# Patient Record
Sex: FEMALE | Race: WHITE | NOT HISPANIC OR LATINO | Employment: FULL TIME
[De-identification: names, ages, dates, MRNs, and addresses within clinical notes are randomized per-mention and may not be internally consistent; named-entity substitution may affect disease eponyms.]

---

## 2017-11-26 ENCOUNTER — HEALTH MAINTENANCE LETTER (OUTPATIENT)
Age: 27
End: 2017-11-26

## 2018-03-08 ENCOUNTER — TELEPHONE (OUTPATIENT)
Dept: TRANSPLANT | Facility: CLINIC | Age: 28
End: 2018-03-08

## 2018-03-08 NOTE — TELEPHONE ENCOUNTER
"Skip to main content     Susi Anguiano  Tab Navigation  HomeKidney Donor Surveys(Currently Selected)Liver Donor SurveysKidney Quarterly ReportLiver Quarterly Report    Content Starts Here  Kidney Donor Survey  Kidney Donor Survey  [id number]     Printable View     Back to List: Kidney Donor Surveys  Open Activities[0]  Activity History[1]  Kidney Donor Survey Detail     LIVING KIDNEY DONOR EVALUATION  Donor First Name   Octavia  Donor MRN      Donor Last Name   Kamila  Completed   3/5/2018 4:44 PM     1990  Record ID   [id number]  BREEZE Screen   PASSED       Intended Recipient  Recipient First Name   Pooja  Recipient MRN      Recipient Last Name   Kim  Relationship   Not related (Other)  Recipient    1964  Recipient Diagnosis      Recipient's ABO           Donor Information  Age   27  Gender   Female  Height   5' 4''  Race     Weight   160  Ethnicity   Not /  BMI   27.5  Preferred Language   English       Required   No      Blood Type   Unknown  Demographics  Home Address   [address] [place] [address] dr Smith #   [phone]  [facility] #      [facility] Code   [address]  Type      Country   [place]  Preferred Contact day   Mon, Fri, Thur, Wed, Tue  Email   [email or web address]  Preferred Contact time   11:00 AM-1:00 PM, 09:00 AM-11:00 AM  Donor's Medical Information  Medical History   Hypothyroidism  Medications   Levothyroxine  Surgical History   None Reported  Allergies   NKDA  Social History   EtOH: Rare (1-2 drinks/month)   Illicit Drug Use: Denies   Tobacco: Denies  Self-Reported Functional Status   \"I am able to participate in moderate recreational activities like golf, double tennis, dancing, throwing a baseball or football\"  Family Medical History   Cancer (denies)   Diabetes (denies)   Heart Disease (denies)   Hypertension (denies)   Kidney Disease (denies)   Kidney Stones (denies)  Exercise " Frequency   Exercise (1 X per week)  Review of Organ Systems  Review of Systems   Airway or Lungs: No   Blood Disorder: No   Cancer: No   Diabetes,Thyroid,Adrenal,Endocrine Disorder: Yes   Digestive or Liver: No   Female Health: No   Heart or Circulatory System: No   Immune Diseases: No   Kidneys and Bladder: No   Muscles,Bones,Joints: No   Neuro: No   Psych: No  Donor's Social Information  Marital Status     Living Accommodation   Owns own home/apartment  Level of Education   Some college education  Living Arrangement   With spouse  Employment Status   Full Time  Concerns: health and life insurance   No  Employer   [employer]  Concerns: job security and lost income   No  Occupation           Medical Insurance Status   Has medical insurance       High Risk Behavior  High Risk Behaviors   Blood transfusion < 12 months. (NO)   Commercial sex < 12 months. (NO)   Illicit IV drug use < 5yrs. (NO)   Other high risk sexual contact < 12 months. (NO)  Reason for Donation  Referral   Tx Candidate  Reason for Donation   I am hoping I can donate my kidney to my husbands Aunt Yael who is currently on dialysis  Permission to Disclose Inquiry   Yes  Patient Comments      Donor Motivation Level   Highly motivated donor       PCP Contact  [facility]  [facility] Phone   [phone]  Emergency Contact  First Name   Devon  First Name   Imelda  Last Name   Kamila  Last Name   Blue  Phone #   [phone]  Phone #   [phone]  Phone Type   Mobile  Phone Type   Mobile  Relationship   Spouse  Relationship   Mother  Office Use  Reviewed By   Dimple Ferrara   3/7/2018 8:45 AM  Admin Folder   Accept  Comments   no MRN  Lost for Followup   Not Checked  Extended Comments      BREEZE ID   [email or web address]:[id number]  survey status   completed       Open Activities  No records to display  Activity History      Subject Task Due Date Last Modified  Date/Time  Call Checked 3/7/2018 3/7/2018 11:56 AM  Back to TopBack To TopAlways show me  Show [facility] records per related list  The information captured in this patient survey is intended as a supplement to, not a substitute for, a complete medical history. All information captured in this patient survey must be reviewed and verified by a healthcare professional. The information is provided as is, and [facility] does not warrant that it is complete or accurate. As such, medical decisions cannot be made based exclusively on the results of this survey.

## 2018-03-09 DIAGNOSIS — Z00.5 TRANSPLANT DONOR EVALUATION: Primary | ICD-10-CM

## 2018-05-02 DIAGNOSIS — Z00.5 TRANSPLANT DONOR EVALUATION: ICD-10-CM

## 2018-05-04 ENCOUNTER — TELEPHONE (OUTPATIENT)
Dept: TRANSPLANT | Facility: CLINIC | Age: 28
End: 2018-05-04

## 2018-05-04 ENCOUNTER — DOCUMENTATION ONLY (OUTPATIENT)
Dept: TRANSPLANT | Facility: CLINIC | Age: 28
End: 2018-05-04

## 2018-05-04 NOTE — TELEPHONE ENCOUNTER
Informed Octavia that her Phase 1's are OK. Average ZQS=321. We hadn't received abo results and Octavia stated the clinic missed sending that and will send it now. They told her that her abo=O. Verified interested in PEP.Will come for eval. Has CD.[place]. Went to [facility] recently.

## 2018-05-07 DIAGNOSIS — Z00.5 TRANSPLANT DONOR EVALUATION: ICD-10-CM

## 2018-05-24 ENCOUNTER — RADIANT APPOINTMENT (OUTPATIENT)
Dept: GENERAL RADIOLOGY | Facility: CLINIC | Age: 28
End: 2018-05-24
Attending: INTERNAL MEDICINE
Payer: COMMERCIAL

## 2018-05-24 ENCOUNTER — ALLIED HEALTH/NURSE VISIT (OUTPATIENT)
Dept: TRANSPLANT | Facility: CLINIC | Age: 28
End: 2018-05-24
Attending: TRANSPLANT SURGERY

## 2018-05-24 ENCOUNTER — OFFICE VISIT (OUTPATIENT)
Dept: NEPHROLOGY | Facility: CLINIC | Age: 28
End: 2018-05-24
Attending: INTERNAL MEDICINE

## 2018-05-24 ENCOUNTER — RESULTS ONLY (OUTPATIENT)
Dept: OTHER | Facility: CLINIC | Age: 28
End: 2018-05-24

## 2018-05-24 ENCOUNTER — INFUSION THERAPY VISIT (OUTPATIENT)
Dept: INFUSION THERAPY | Facility: CLINIC | Age: 28
End: 2018-05-24
Attending: INTERNAL MEDICINE

## 2018-05-24 ENCOUNTER — OFFICE VISIT (OUTPATIENT)
Dept: TRANSPLANT | Facility: CLINIC | Age: 28
End: 2018-05-24
Attending: TRANSPLANT SURGERY

## 2018-05-24 ENCOUNTER — APPOINTMENT (OUTPATIENT)
Dept: GENERAL RADIOLOGY | Facility: CLINIC | Age: 28
End: 2018-05-24
Payer: COMMERCIAL

## 2018-05-24 ENCOUNTER — RADIANT APPOINTMENT (OUTPATIENT)
Dept: CT IMAGING | Facility: CLINIC | Age: 28
End: 2018-05-24
Attending: INTERNAL MEDICINE
Payer: COMMERCIAL

## 2018-05-24 VITALS
SYSTOLIC BLOOD PRESSURE: 113 MMHG | HEIGHT: 65 IN | WEIGHT: 170 LBS | BODY MASS INDEX: 28.32 KG/M2 | TEMPERATURE: 97.6 F | RESPIRATION RATE: 18 BRPM | DIASTOLIC BLOOD PRESSURE: 78 MMHG | OXYGEN SATURATION: 98 % | HEART RATE: 64 BPM

## 2018-05-24 DIAGNOSIS — Z00.5 TRANSPLANT DONOR EVALUATION: Primary | ICD-10-CM

## 2018-05-24 DIAGNOSIS — Z00.5 TRANSPLANT DONOR EVALUATION: ICD-10-CM

## 2018-05-24 LAB
ABO + RH BLD: NORMAL
ABO + RH BLD: NORMAL
ALBUMIN SERPL-MCNC: 4 G/DL (ref 3.4–5)
ALBUMIN UR-MCNC: NEGATIVE MG/DL
ALP SERPL-CCNC: 55 U/L (ref 40–150)
ALT SERPL W P-5'-P-CCNC: 16 U/L (ref 0–50)
ANION GAP SERPL CALCULATED.3IONS-SCNC: 5 MMOL/L (ref 3–14)
APPEARANCE UR: CLEAR
APTT PPP: 35 SEC (ref 22–37)
AST SERPL W P-5'-P-CCNC: 14 U/L (ref 0–45)
B-HCG SERPL-ACNC: <1 IU/L (ref 0–5)
BILIRUB SERPL-MCNC: 0.4 MG/DL (ref 0.2–1.3)
BILIRUB UR QL STRIP: NEGATIVE
BLD GP AB SCN SERPL QL: NORMAL
BLOOD BANK CMNT PATIENT-IMP: NORMAL
BUN SERPL-MCNC: 12 MG/DL (ref 7–30)
CALCIUM SERPL-MCNC: 9.1 MG/DL (ref 8.5–10.1)
CHLORIDE SERPL-SCNC: 108 MMOL/L (ref 94–109)
CHOLEST SERPL-MCNC: 185 MG/DL
CO2 SERPL-SCNC: 26 MMOL/L (ref 20–32)
COLOR UR AUTO: ABNORMAL
CREAT SERPL-MCNC: 0.78 MG/DL (ref 0.52–1.04)
CREAT UR-MCNC: 51 MG/DL
ERYTHROCYTE [DISTWIDTH] IN BLOOD BY AUTOMATED COUNT: 12.3 % (ref 10–15)
GFR SERPL CREATININE-BSD FRML MDRD: 89 ML/MIN/1.7M2
GLUCOSE SERPL-MCNC: 82 MG/DL (ref 70–99)
GLUCOSE UR STRIP-MCNC: NEGATIVE MG/DL
HBA1C MFR BLD: 4.7 % (ref 0–5.6)
HBV CORE AB SERPL QL IA: NONREACTIVE
HBV SURFACE AB SERPL IA-ACNC: 383.25 M[IU]/ML
HBV SURFACE AG SERPL QL IA: NONREACTIVE
HCT VFR BLD AUTO: 37.8 % (ref 35–47)
HCV AB SERPL QL IA: NONREACTIVE
HDLC SERPL-MCNC: 60 MG/DL
HGB BLD-MCNC: 12.9 G/DL (ref 11.7–15.7)
HGB UR QL STRIP: ABNORMAL
HIV 1+2 AB+HIV1 P24 AG SERPL QL IA: NONREACTIVE
INR PPP: 1.01 (ref 0.86–1.14)
KETONES UR STRIP-MCNC: NEGATIVE MG/DL
LDLC SERPL CALC-MCNC: 110 MG/DL
LEUKOCYTE ESTERASE UR QL STRIP: NEGATIVE
MCH RBC QN AUTO: 30.6 PG (ref 26.5–33)
MCHC RBC AUTO-ENTMCNC: 34.1 G/DL (ref 31.5–36.5)
MCV RBC AUTO: 90 FL (ref 78–100)
MICROALBUMIN UR-MCNC: <5 MG/L
MICROALBUMIN/CREAT UR: NORMAL MG/G CR (ref 0–25)
MUCOUS THREADS #/AREA URNS LPF: PRESENT /LPF
NITRATE UR QL: NEGATIVE
NONHDLC SERPL-MCNC: 125 MG/DL
PH UR STRIP: 5 PH (ref 5–7)
PHOSPHATE SERPL-MCNC: 3.5 MG/DL (ref 2.5–4.5)
PLATELET # BLD AUTO: 271 10E9/L (ref 150–450)
POTASSIUM SERPL-SCNC: 4 MMOL/L (ref 3.4–5.3)
PROT SERPL-MCNC: 7.2 G/DL (ref 6.8–8.8)
PROT UR-MCNC: <0.05 G/L
PROT/CREAT 24H UR: NORMAL G/G CR (ref 0–0.2)
RADIOLOGIST FLAGS: NORMAL
RBC # BLD AUTO: 4.21 10E12/L (ref 3.8–5.2)
RBC #/AREA URNS AUTO: <1 /HPF (ref 0–2)
SODIUM SERPL-SCNC: 139 MMOL/L (ref 133–144)
SOURCE: ABNORMAL
SP GR UR STRIP: 1.01 (ref 1–1.03)
SPECIMEN EXP DATE BLD: NORMAL
SQUAMOUS #/AREA URNS AUTO: <1 /HPF (ref 0–1)
TRIGL SERPL-MCNC: 74 MG/DL
URATE SERPL-MCNC: 3.6 MG/DL (ref 2.6–6)
UROBILINOGEN UR STRIP-MCNC: 0 MG/DL (ref 0–2)
WBC # BLD AUTO: 7.4 10E9/L (ref 4–11)
WBC #/AREA URNS AUTO: <1 /HPF (ref 0–5)

## 2018-05-24 PROCEDURE — 86665 EPSTEIN-BARR CAPSID VCA: CPT | Mod: 91 | Performed by: INTERNAL MEDICINE

## 2018-05-24 PROCEDURE — 86780 TREPONEMA PALLIDUM: CPT | Performed by: INTERNAL MEDICINE

## 2018-05-24 PROCEDURE — 86753 PROTOZOA ANTIBODY NOS: CPT | Performed by: INTERNAL MEDICINE

## 2018-05-24 PROCEDURE — 84156 ASSAY OF PROTEIN URINE: CPT | Performed by: INTERNAL MEDICINE

## 2018-05-24 PROCEDURE — 86644 CMV ANTIBODY: CPT | Performed by: INTERNAL MEDICINE

## 2018-05-24 PROCEDURE — 80053 COMPREHEN METABOLIC PANEL: CPT | Performed by: INTERNAL MEDICINE

## 2018-05-24 PROCEDURE — 86803 HEPATITIS C AB TEST: CPT | Performed by: INTERNAL MEDICINE

## 2018-05-24 PROCEDURE — 85730 THROMBOPLASTIN TIME PARTIAL: CPT | Performed by: INTERNAL MEDICINE

## 2018-05-24 PROCEDURE — 86480 TB TEST CELL IMMUN MEASURE: CPT | Performed by: INTERNAL MEDICINE

## 2018-05-24 PROCEDURE — 84702 CHORIONIC GONADOTROPIN TEST: CPT | Performed by: INTERNAL MEDICINE

## 2018-05-24 PROCEDURE — 86706 HEP B SURFACE ANTIBODY: CPT | Performed by: INTERNAL MEDICINE

## 2018-05-24 PROCEDURE — 81001 URINALYSIS AUTO W/SCOPE: CPT | Performed by: INTERNAL MEDICINE

## 2018-05-24 PROCEDURE — 86682 HELMINTH ANTIBODY: CPT | Performed by: INTERNAL MEDICINE

## 2018-05-24 PROCEDURE — 84100 ASSAY OF PHOSPHORUS: CPT | Performed by: INTERNAL MEDICINE

## 2018-05-24 PROCEDURE — 25000128 H RX IP 250 OP 636: Mod: JW,ZF | Performed by: INTERNAL MEDICINE

## 2018-05-24 PROCEDURE — 85027 COMPLETE CBC AUTOMATED: CPT | Performed by: INTERNAL MEDICINE

## 2018-05-24 PROCEDURE — 86900 BLOOD TYPING SEROLOGIC ABO: CPT | Performed by: INTERNAL MEDICINE

## 2018-05-24 PROCEDURE — 86665 EPSTEIN-BARR CAPSID VCA: CPT | Performed by: INTERNAL MEDICINE

## 2018-05-24 PROCEDURE — 87340 HEPATITIS B SURFACE AG IA: CPT | Performed by: INTERNAL MEDICINE

## 2018-05-24 PROCEDURE — 84550 ASSAY OF BLOOD/URIC ACID: CPT | Performed by: INTERNAL MEDICINE

## 2018-05-24 PROCEDURE — 85610 PROTHROMBIN TIME: CPT | Performed by: INTERNAL MEDICINE

## 2018-05-24 PROCEDURE — 40000866 ZZHCL STATISTIC HIV 1/2 ANTIGEN/ANTIBODY PRETRANSPLANT ONLY: Performed by: INTERNAL MEDICINE

## 2018-05-24 PROCEDURE — 86704 HEP B CORE ANTIBODY TOTAL: CPT | Performed by: INTERNAL MEDICINE

## 2018-05-24 PROCEDURE — 80061 LIPID PANEL: CPT | Performed by: INTERNAL MEDICINE

## 2018-05-24 PROCEDURE — 83036 HEMOGLOBIN GLYCOSYLATED A1C: CPT | Performed by: INTERNAL MEDICINE

## 2018-05-24 PROCEDURE — 36415 COLL VENOUS BLD VENIPUNCTURE: CPT | Performed by: INTERNAL MEDICINE

## 2018-05-24 PROCEDURE — 82043 UR ALBUMIN QUANTITATIVE: CPT | Performed by: INTERNAL MEDICINE

## 2018-05-24 PROCEDURE — G0463 HOSPITAL OUTPT CLINIC VISIT: HCPCS | Mod: ZF

## 2018-05-24 PROCEDURE — 86850 RBC ANTIBODY SCREEN: CPT | Performed by: INTERNAL MEDICINE

## 2018-05-24 PROCEDURE — 82542 COL CHROMOTOGRAPHY QUAL/QUAN: CPT | Performed by: INTERNAL MEDICINE

## 2018-05-24 PROCEDURE — 86901 BLOOD TYPING SEROLOGIC RH(D): CPT | Performed by: INTERNAL MEDICINE

## 2018-05-24 RX ORDER — LEVOTHYROXINE SODIUM 137 UG/1
137 TABLET ORAL DAILY
COMMUNITY
Start: 2017-11-15 | End: 2018-11-15

## 2018-05-24 RX ORDER — IOPAMIDOL 755 MG/ML
100 INJECTION, SOLUTION INTRAVASCULAR ONCE
Status: COMPLETED | OUTPATIENT
Start: 2018-05-24 | End: 2018-05-24

## 2018-05-24 RX ADMIN — IOHEXOL 5 ML: 300 INJECTION, SOLUTION INTRAVENOUS at 08:48

## 2018-05-24 RX ADMIN — IOPAMIDOL 100 ML: 755 INJECTION, SOLUTION INTRAVASCULAR at 14:43

## 2018-05-24 ASSESSMENT — PAIN SCALES - GENERAL: PAINLEVEL: NO PAIN (0)

## 2018-05-24 NOTE — PROGRESS NOTES
Outpatient MNT: Kidney Donor Evaluation    Current BMI: 28 (HT 65 in,  lbs/77 kg)  BMI is within criteria of <30 for kidney donation    8 Year Risk of Diabetes  </= 3%     Time Spent: 15 minutes  Visit Type: Initial  Referring Physician: Dr Funes  Pt accompanied by: self    Nutrition Assessment  Pt reports eating healthier overall for the last few months in attempt to lose weight after gaining >40 lbs with hypothyroid dx.     Vitamins, Supplements, Pertinent Meds: MVI   Herbal Medicines/Supplements: none     Diet Recall  Breakfast Eggs with turkey watkins and fruit or cereal    Lunch Leftovers    Dinner Stir ayers with rice, chix, and veggies or steak and veggies    Snacks Cottage cheese    Beverages Water, coffee, tea, protein 2.0 drink (15 g protein; does not drink daily)   Alcohol None    Dining out A few times/month      Physical Activity  [place]  Occasional gym as well      Anthropometrics  Height:   65 in   BMI:    28    Weight Status:Overweight BMI 25-29.9   Weight:  170 lbs            IBW (lb): 125  % IBW: 136    Wt Hx: Pt has lost 8 lbs in the last 2 months with eating changes. Prior to that, pt had gained >40-50 lbs over the past 6 years with hypothyroid.     Adj/dosing BW: 136 lbs/62 kg       Labs  Recent Labs   Lab Test  05/24/18   0826   CHOL  185   HDL  60   LDL  110*   TRIG  74       FBG = 82  A1C = 4.7    Prediction of Incident Diabetes Mellitus in Middle-aged Adults: The [facility] Offspring Study  Fran Posey MD; James B. Meigs, MD, MPH; Sasha Gonzalez, PhD; Cee Quinn MD, MPH; Gilbert Gardner MD; Umesh Holbrook Sr,   PhD  Pt's estimated risk for T2DM (per Table 6 above)  Pt received points for the following criteria: BMI>25  Total points: 2  8-Year risk of T2DM: </= 3%    Estimated Nutrition Needs  Energy  3902-0717    (20-25 kcal/kg for maintenance vs weight loss)     Protein  50-62    (0.8-1 g/kg for maintenance)           Fluid  1  ml/kcal or per MD     Nutrition Diagnosis  Food and nutrition related knowledge deficit r/t pre transplant donor eval pt AEB pt verbalized not hearing pre/post transplant diet guidelines.    Nutrition Intervention  Nutrition education provided:    Reviewed overall healthy diet guidelines for pre and post kidney donation. Discussed maintenance of a healthy weight, Na+ intake <3000 mg/day, and avoidance of herbal supplements post donation d/t unknown effects on the kidney.    Patient Understanding: Pt verbalized understanding of education provided.  Expected Compliance: Good  Follow-Up Plans: PRN     Nutrition Goals  Pt to verbalize understanding of education provided     Provided pt with contact info.   Marita Carrillo RD, LD  Pgr [phone]

## 2018-05-24 NOTE — LETTER
5/24/2018       RE: Octavia Treviño  [address] [place] [address]     Dear Colleague,    Thank you for referring your patient, Octavia Treviño, to the [facility]ID [facility]GAN TRANSPL[facility] at [facility]. Please see a copy of my visit note below.    RE: Octavia Treviño  [facility] #[id number]           I saw your patient, Octavia Treviño, in consultation in our pretransplant clinic. She was here at clinic for evaluation as a possible kidney donor to a friend of the family.  She presented to clinic today with her mother. Our donor coordinator shared our center-specific results with her.  We discussed:    (1) The risks of donation--including mortality (0.03% risk) and morbidity (approximately 1% risk of major morbidity).  We discussed the major reported causes of death after kidney donation (bleeding, infection, pulmonary embolism).  We discussed the potential complications, including:  bleeding requiring reoperation, infection requiring reoperation, pneumonia, bowel obstruction, infection at the site of the incision, hernia at the site of the incision, deep vein thrombosis, deep vein thrombosis with associated pulmonary embolism, and urinary tract infection.  I told her I could not possibly name all of the risks, but that she was at risk for any complications that could occur in any operation (and that a local library would have books/resources that could provide more detail).       (2) We also discussed the long-term risks of living with one kidney.  We talked in detail about the new publications suggesting slightly increased long-term risk of ESRD after donor nephrectomy.  We discussed the fact that most of the ESRD that has developed after donation has occurred in those donating to a relative; and that it is known that individuals who have a relative with ESRD are at increased risk of ESRD.  For people her age, there is really no data to show what the outcome will be at 50-60  years.  This is an important consideration.  We discussed the rare diseases that might affect having only one kidney.    (3) The hospital stay and the fact that if all goes well, discharge would occur within two to three days after donor nephrectomy.  We also discussed the fact that there would be no diet or fluid restrictions (again if all went well), and that the only new medication that she would be on would be pain medication.  We discussed the fact that most patients are on Tylenol or something similar within five to six days of surgery.      (4) The recovery time after surgery and the restrictions that are necessary:  a) no driving for a couple of weeks (or until she felt that her reaction would be adequate if she had to slam on the brakes; and b) no lifting over 10 pounds or any exercise that would stretch her abdominal muscles for six weeks (to allow the muscles to heal so that she doesn't develop a hernia).  We also discussed return to work, which could occur in approximately three to four weeks if she had a desk job or would require not returning to work for at least six weeks if the job required any heavy lifting or exercise.  We discussed the potential for not getting her pep and energy back for anywhere from two to six weeks after surgery and how there was a tremendous individual variation in return of full energy level.  I discussed our donor follow-up studies; and that in our surveys, 90% of donors had their energy back by 6 weeks postdonation.    (5) The concern that long distance travel for the first couple of weeks post-donation.  We discussed the risks of deep vein thrombosis associated with plane or car travel.  I recommended that, if flying, she should get up and walk around every 30-40 minutes; if driving she should ask the  to stop the car every 30-45 minutes so Ms. Treviño could take a short walk.    (6) The fact that the recipient could be on a waiting list for  donor  transplant and would ultimately receive a kidney if Ms. Treviño did not donate.      I attempted to answer any remaining questions.  I also told her that should she have any questions, she should feel free to contact us.  We would be glad to answer any questions either over the phone or at another clinic visit.  Her transplant coordinator is Niki Zafar and may be reached at [phone].  Thank you for the opportunity to see her.    I spent 30 minutes with this patient.  Over 95% of that time was spent in counseling and coordination of care.             Yours truly,               Mark Funes MD         Professor of Surgery         ([phone])          AJM/st    Again, thank you for allowing me to participate in the care of your patient.      Sincerely,    Mark Funes MD

## 2018-05-24 NOTE — PROGRESS NOTES
RE: Octavia Treviño  [facility] #[id number]           I saw your patient, Octavia Treviño, in consultation in our pretransplant clinic. She was here at clinic for evaluation as a possible kidney donor to a friend of the family.  She presented to clinic today with her mother. Our donor coordinator shared our center-specific results with her.  We discussed:    (1) The risks of donation--including mortality (0.03% risk) and morbidity (approximately 1% risk of major morbidity).  We discussed the major reported causes of death after kidney donation (bleeding, infection, pulmonary embolism).  We discussed the potential complications, including:  bleeding requiring reoperation, infection requiring reoperation, pneumonia, bowel obstruction, infection at the site of the incision, hernia at the site of the incision, deep vein thrombosis, deep vein thrombosis with associated pulmonary embolism, and urinary tract infection.  I told her I could not possibly name all of the risks, but that she was at risk for any complications that could occur in any operation (and that a local library would have books/resources that could provide more detail).       (2) We also discussed the long-term risks of living with one kidney.  We talked in detail about the new publications suggesting slightly increased long-term risk of ESRD after donor nephrectomy.  We discussed the fact that most of the ESRD that has developed after donation has occurred in those donating to a relative; and that it is known that individuals who have a relative with ESRD are at increased risk of ESRD.  For people her age, there is really no data to show what the outcome will be at 50-60 years.  This is an important consideration.  We discussed the rare diseases that might affect having only one kidney.    (3) The hospital stay and the fact that if all goes well, discharge would occur within two to three days after donor nephrectomy.  We also discussed the fact that there  would be no diet or fluid restrictions (again if all went well), and that the only new medication that she would be on would be pain medication.  We discussed the fact that most patients are on Tylenol or something similar within five to six days of surgery.      (4) The recovery time after surgery and the restrictions that are necessary:  a) no driving for a couple of weeks (or until she felt that her reaction would be adequate if she had to slam on the brakes; and b) no lifting over 10 pounds or any exercise that would stretch her abdominal muscles for six weeks (to allow the muscles to heal so that she doesn't develop a hernia).  We also discussed return to work, which could occur in approximately three to four weeks if she had a desk job or would require not returning to work for at least six weeks if the job required any heavy lifting or exercise.  We discussed the potential for not getting her pep and energy back for anywhere from two to six weeks after surgery and how there was a tremendous individual variation in return of full energy level.  I discussed our donor follow-up studies; and that in our surveys, 90% of donors had their energy back by 6 weeks postdonation.    (5) The concern that long distance travel for the first couple of weeks post-donation.  We discussed the risks of deep vein thrombosis associated with plane or car travel.  I recommended that, if flying, she should get up and walk around every 30-40 minutes; if driving she should ask the  to stop the car every 30-45 minutes so Ms. Treviño could take a short walk.    (6) The fact that the recipient could be on a waiting list for  donor transplant and would ultimately receive a kidney if Ms. Treviño did not donate.      I attempted to answer any remaining questions.  I also told her that should she have any questions, she should feel free to contact us.  We would be glad to answer any questions either over the phone or at  another clinic visit.  Her transplant coordinator is Niki Zafar and may be reached at [phone].  Thank you for the opportunity to see her.    I spent 30 minutes with this patient.  Over 95% of that time was spent in counseling and coordination of care.             Yours truly,               Mark Funes MD         Professor of Surgery         ([phone])          ABIOLA/

## 2018-05-24 NOTE — Clinical Note
5/24/2018      RE: Octavia Treviño  [address]  [facility] [address]       Assessment and Plan:  1. Prospective kidney transplant donor with no issues that need to be addressed prior to donation. Patient's blood pressure is acceptable at this visit, kidney function appears to be acceptable with Iohexol pending, and urinalysis is bland.    Discussed the risks of donating a kidney, including the surgical risk and the possible risks of living with one kidney.    Education about expected post-donation kidney function and how chronic kidney disease (CKD) and end stage kidney disease (ESKD) might potentially impact the donor in the future, include, but not limited to:       - On average, donors will have 25-35% permanent loss of kidney function at donation.       - Baseline risk of ESKD may slightly exceed that of members of the general population with the same demographic profile.       - Donor risks must be interpreted in light of known epidemiology of both CKD or ESKD, such as that CKD generally develops in midlife (40-50 years old) and ESKD generally develops after age 60.       - Medical evaluation of young potential donors cannot predict lifetime risk of CKD or ESKD.       - Donors may be at higher risk for CKD if they sustain damage to the remaining kidney.       - Development of CKD and progression of ESKD may be more rapid with only 1 kidney.       - Some type of kidney replacement therapy, either kidney transplant or dialysis, is required when reaching ESKD.    Potential medical or surgical risks include, but not limited to:       - Death.       - Scars, pain, fatigue, and other consequences typical of any surgical procedure.       - Decreased kidney function.       - Abdominal or bowel symptoms, such as bloating and nausea, and developing bowel obstruction.       - Kidney failure (ESKD) and the need for a kidney transplant or dialysis for the donor.       - Impact of obesity, hypertension, or other  donor-specific medical conditions on morbidity and mortality of the potential donor.    Patients overall evaluation will be discussed with the transplant team and a final recommendation on the patients' suitability to be a kidney transplant donor will be made at that time.    Consult:  Octavia Treviño was seen in consultation at the request of Dr. Mark Funes for evaluation as a potential kidney transplant donor.    Reason for Visit:  Octavia Treviño is a 27 year old female who presents for a kidney donor evaluation.  Patient would like to donate to husbands aunt.    HPI:    Patient  With a PMH of hypothyroidism and is on levothyroxine follows endocrinology  No nausea , vomiting , diarrhea , no ENT problems , mild headache today  No rash or skin problems           Kidney Disease Hx:       h/o Kidney Problems: No  Family h/o Genetic Kidney Disease: No       h/o HTN: No    Usual BP: 116/70       h/o Protein in Urine: No  h/o Blood in Urine: No       h/o Kidney Stones: No  h/o Kidney Injury: No       h/o Recurrent UTI: No  h/o Genitourinary Problems: No       h/o Chronic NSAID Use: No         Other Medical Hx:       h/o DM: No   h/o Gestational DM: No       h/o Preeclampsia: No          h/o Gastrointestinal, Pancreas or Liver Problems: No       h/o Lung or Heart Problems: No       h/o Hematologic Problems: No  h/o Bleeding or Clotting Problems: Post partum hemorrhage in the second pregnanacy       h/o Cancer: No       h/o Infection Problems: No         Skin Cancer Risk:       h/o more than 50 moles: No       h/o extensive sun exposure: No       h/o melanoma: No       Family h/o melanoma: melanoma in great grand aunt         Mental Health Assessment:       h/o Depression: No       h/o Psychiatric Illness: No       h/o Suicidal Attempt(s): No    ROS:   A comprehensive review of systems was obtained and negative, except as noted in the HPI or PMH.    PMH:   History was taken from the patient as noted below.  Past  "Medical History:   Diagnosis Date     Thyroid disease        PSH:   Past Surgical History:   Procedure Laterality Date     NO HISTORY OF SURGERY       Personal or family history of anesthesia problems: No    Family Hx:   Family History   Problem Relation Age of Onset     Pacemaker Mother      Unknown/Adopted Father      Lupus Maternal Grandmother           Specific Family Hx:       FH of DM: No       FH of CAD: No  FH of HTN: No       FH of Cancer: melanoma in great grand aunt  FH of Kidney Cancer: No    Personal Hx:   Social History     Social History     Marital status:      Spouse name: N/A     Number of children: 2     Years of education: N/A     Occupational History           Social History Main Topics     Smoking status: Never Smoker     Smokeless tobacco: Never Used     Alcohol use No     Drug use: No     Sexual activity: Yes     Partners: Male     Birth control/ protection: Condom     Other Topics Concern     Not on file     Social History Narrative          Specific Social Hx:       Health Insurance Status: Yes       Employment Status: Part time  Occupation:                        Living Arrangements: lives with their spouse       Social Support: Yes       Presence of increased risk for disease transmission behaviors as defined by PHS guidelines: No        Allergies:  No Known Allergies    Medications:  Prior to Admission medications    Medication Sig Start Date End Date Taking? Authorizing Provider   levothyroxine (SYNTHROID/LEVOTHROID) 137 MCG tablet Take 137 mcg by mouth daily 11/15/17 11/15/18 Yes Reported, Patient       Vitals:  Vital Signs 5/24/2018 5/24/2018 5/24/2018   Systolic - 127 119   Diastolic - 73 83   Pulse - 64 -   Temperature - 97.6 -   Respirations - 18 -   Weight (LB) - 170 lb -   Height - 5' 5.354\" -   BMI (Calculated) - 28.04 -   Pain 0 - -   O2 - 98 -       Exam:   GENERAL APPEARANCE: alert and no distress  HENT: mouth without ulcers or lesions  LYMPHATICS: no " cervical or supraclavicular nodes  RESP: lungs clear to auscultation - no rales, rhonchi or wheezes  CV: regular rhythm, normal rate, no rub, no murmur  EDEMA: no LE edema bilaterally  ABDOMEN: soft, nondistended, nontender, bowel sounds normal  MS: extremities normal - no gross deformities noted, no evidence of inflammation in joints, no muscle tenderness  SKIN: no rash    Results:   Labs and imaging were ordered for this visit and reviewed by me.  Recent Results (from the past 336 hour(s))   EKG 12-lead complete w/read - Clinics    Collection Time: 05/24/18  6:58 AM   Result Value Ref Range    Interpretation ECG Click View Image link to view waveform and result    ABO/Rh type and screen    Collection Time: 05/24/18  8:25 AM   Result Value Ref Range    ABO O     RH(D) Pos     Antibody Screen Neg     Test Valid Only At          [facility]    Specimen Expires 05/27/2018    Lipid Profile    Collection Time: 05/24/18  8:26 AM   Result Value Ref Range    Cholesterol 185 <200 mg/dL    Triglycerides 74 <150 mg/dL    HDL Cholesterol 60 >49 mg/dL    LDL Cholesterol Calculated 110 (H) <100 mg/dL    Non HDL Cholesterol 125 <130 mg/dL   Comprehensive metabolic panel    Collection Time: 05/24/18  8:26 AM   Result Value Ref Range    Sodium 139 133 - 144 mmol/L    Potassium 4.0 3.4 - 5.3 mmol/L    Chloride 108 94 - 109 mmol/L    Carbon Dioxide 26 20 - 32 mmol/L    Anion Gap 5 3 - 14 mmol/L    Glucose 82 70 - 99 mg/dL    Urea Nitrogen 12 7 - 30 mg/dL    Creatinine 0.78 0.52 - 1.04 mg/dL    GFR Estimate 89 >60 mL/min/1.7m2    GFR Estimate If Black >90 >60 mL/min/1.7m2    Calcium 9.1 8.5 - 10.1 mg/dL    Bilirubin Total 0.4 0.2 - 1.3 mg/dL    Albumin 4.0 3.4 - 5.0 g/dL    Protein Total 7.2 6.8 - 8.8 g/dL    Alkaline Phosphatase 55 40 - 150 U/L    ALT 16 0 - 50 U/L    AST 14 0 - 45 U/L   HCG quantitative pregnancy    Collection Time: 05/24/18  8:26 AM   Result Value Ref Range    HCG  Quantitative Serum <1 0 - 5 IU/L   Phosphorus    Collection Time: 05/24/18  8:26 AM   Result Value Ref Range    Phosphorus 3.5 2.5 - 4.5 mg/dL   Hemoglobin A1c    Collection Time: 05/24/18  8:26 AM   Result Value Ref Range    Hemoglobin A1C 4.7 0 - 5.6 %   INR    Collection Time: 05/24/18  8:26 AM   Result Value Ref Range    INR 1.01 0.86 - 1.14   Partial thromboplastin time    Collection Time: 05/24/18  8:26 AM   Result Value Ref Range    PTT 35 22 - 37 sec   CBC with platelets    Collection Time: 05/24/18  8:26 AM   Result Value Ref Range    WBC 7.4 4.0 - 11.0 10e9/L    RBC Count 4.21 3.8 - 5.2 10e12/L    Hemoglobin 12.9 11.7 - 15.7 g/dL    Hematocrit 37.8 35.0 - 47.0 %    MCV 90 78 - 100 fl    MCH 30.6 26.5 - 33.0 pg    MCHC 34.1 31.5 - 36.5 g/dL    RDW 12.3 10.0 - 15.0 %    Platelet Count 271 150 - 450 10e9/L   Uric acid    Collection Time: 05/24/18  8:26 AM   Result Value Ref Range    Uric Acid 3.6 2.6 - 6.0 mg/dL   Routine UA with microscopic    Collection Time: 05/24/18  8:44 AM   Result Value Ref Range    Color Urine Straw     Appearance Urine Clear     Glucose Urine Negative NEG^Negative mg/dL    Bilirubin Urine Negative NEG^Negative    Ketones Urine Negative NEG^Negative mg/dL    Specific Gravity Urine 1.008 1.003 - 1.035    Blood Urine Small (A) NEG^Negative    pH Urine 5.0 5.0 - 7.0 pH    Protein Albumin Urine Negative NEG^Negative mg/dL    Urobilinogen mg/dL 0.0 0.0 - 2.0 mg/dL    Nitrite Urine Negative NEG^Negative    Leukocyte Esterase Urine Negative NEG^Negative    Source Midstream Urine     WBC Urine <1 0 - 5 /HPF    RBC Urine <1 0 - 2 /HPF    Squamous Epithelial /HPF Urine <1 0 - 1 /HPF    Mucous Urine Present (A) NEG^Negative /LPF   Albumin Random Urine Quantitative with Creat Ratio    Collection Time: 05/24/18  8:44 AM   Result Value Ref Range    Creatinine Urine 51 mg/dL    Albumin Urine mg/L <5 mg/L    Albumin Urine mg/g Cr Unable to calculate due to low value 0 - 25 mg/g Cr             Denis  Galindo Burris MD

## 2018-05-24 NOTE — MR AVS SNAPSHOT
"              After Visit Summary   5/24/2018    Octavia Treviño    MRN: [id number]           Patient Information     Date Of Birth          1990        Visit Information        Provider Department      5/24/2018 1:00 PM Denis Burris MD [facility] Nephrology        Today's Diagnoses     Transplant donor evaluation    -  1       Follow-ups after your visit        Your next 10 appointments already scheduled     May 29, 2018  1:00 PM CDT   (Arrive by 12:45 PM)   New Patient Visit with Beto Easley MD   [facility] Interventional Radiology ([facility])    [address] [place] [id number] [phone]              Who to contact     If you have questions or need follow up information about today's clinic visit or your schedule please contact [facility] NEPHROLOG[facility] directly at [phone].  Normal or non-critical lab and imaging results will be communicated to you by MyChart, letter or phone within 4 business days after the clinic has received the results. If you do not hear from us within 7 days, please contact the clinic through MyChart or phone. If you have a critical or abnormal lab result, we will notify you by phone as soon as possible.  Submit refill requests through [employer] or call your pharmacy and they will forward the refill request to us. Please allow 3 business days for your refill to be completed.          Additional Information About Your Visit        MyChart Information     [employer] lets you send messages to your doctor, view your test results, renew your prescriptions, schedule appointments and more. To sign up, go to [email or web address] . Click on \"Log in\" on the left side of the screen, which will take you to the Welcome page. Then click on \"Sign up Now\" on the right side of the page.     You will be asked to enter the access code listed below, as well as some personal information. Please follow the directions to create your username and " "password.     Your access code is: [email or web address]  Expires: 2018  6:30 AM     Your access code will  in 90 days. If you need help or a new code, please call your [facility] clinic or [phone].        Care EveryWhere ID     This is your Care EveryWhere ID. This could be used by other organizations to access your [facility] medical records  [id number]        Your Vitals Were     Pulse Temperature Respirations Height Pulse Oximetry BMI (Body Mass Index)    64 97.6  F (36.4  C) (Oral) 18 1.66 m (5' 5.35\") 98% 27.98 kg/m2       Blood Pressure from Last 3 Encounters:   18 113/78   16 111/86   13 115/78    Weight from Last 3 Encounters:   18 77.1 kg (170 lb)   16 74.8 kg (165 lb)   10/06/13 77.1 kg (170 lb)              Today, you had the following     No orders found for display         Today's Medication Changes          These changes are accurate as of 18 11:59 PM.  If you have any questions, ask your nurse or doctor.               These medicines have changed or have updated prescriptions.        Dose/Directions    levothyroxine 137 MCG tablet   Commonly known as:  SYNTHROID/LEVOTHROID   This may have changed:  Another medication with the same name was removed. Continue taking this medication, and follow the directions you see here.   Changed by:  Denis Burris MD        Dose:  137 mcg   Take 137 mcg by mouth daily   Refills:  0         Stop taking these medicines if you haven't already. Please contact your care team if you have questions.     dextromethorphan-guaiFENesin  MG per 12 hr tablet   Commonly known as:  MUCINEX DM   Stopped by:  Denis Burris MD           IBUPROFEN PO   Stopped by:  Denis Burris MD                    Primary Care Provider Office Phone # Fax #    [facility], [phone]       [address] [place] [id number]        Equal Access to Services     RODRIGO GOMEZ AH: Claribel rojas " Glenn, marbella negretenatalieha, chloe sparrow, star autumnin hayaan christinemarcia trifarnaz laNaskatie oskar. So [facility] [phone].    ATENCIÓN: Si habla vj, tiene a fajardo disposición servicios gratuitos de asistencia lingüística. Hector al [phone].    We comply with applicable federal civil rights laws and [place] laws. We do not discriminate on the basis of race, color, national origin, age, disability, sex, sexual orientation, or gender identity.            Thank you!     Thank you for choosing [facility] NE[facility]  for your care. Our goal is always to provide you with excellent care. Hearing back from our patients is one way we can continue to improve our services. Please take a few minutes to complete the written survey that you may receive in the mail after your visit with us. Thank you!             Your Updated Medication List - Protect others around you: Learn how to safely use, store and throw away your medicines at www.disposemymeds.org.          This list is accurate as of 5/24/18 11:59 PM.  Always use your most recent med list.                   Brand Name Dispense Instructions for use Diagnosis    levothyroxine 137 MCG tablet    SYNTHROID/LEVOTHROID     Take 137 mcg by mouth daily

## 2018-05-24 NOTE — PROGRESS NOTES
Assessment and Plan:  1. Prospective kidney transplant donor with no issues that need to be addressed prior to donation. Patient's blood pressure is acceptable at this visit, kidney function appears to be acceptable with Iohexol pending, and urinalysis is bland.    Discussed the risks of donating a kidney, including the surgical risk and the possible risks of living with one kidney.    Education about expected post-donation kidney function and how chronic kidney disease (CKD) and end stage kidney disease (ESKD) might potentially impact the donor in the future, include, but not limited to:       - On average, donors will have 25-35% permanent loss of kidney function at donation.       - Baseline risk of ESKD may slightly exceed that of members of the general population with the same demographic profile.       - Donor risks must be interpreted in light of known epidemiology of both CKD or ESKD, such as that CKD generally develops in midlife (40-50 years old) and ESKD generally develops after age 60.       - Medical evaluation of young potential donors cannot predict lifetime risk of CKD or ESKD.       - Donors may be at higher risk for CKD if they sustain damage to the remaining kidney.       - Development of CKD and progression of ESKD may be more rapid with only 1 kidney.       - Some type of kidney replacement therapy, either kidney transplant or dialysis, is required when reaching ESKD.    Potential medical or surgical risks include, but not limited to:       - Death.       - Scars, pain, fatigue, and other consequences typical of any surgical procedure.       - Decreased kidney function.       - Abdominal or bowel symptoms, such as bloating and nausea, and developing bowel obstruction.       - Kidney failure (ESKD) and the need for a kidney transplant or dialysis for the donor.       - Impact of obesity, hypertension, or other donor-specific medical conditions on morbidity and mortality of the potential  donor.    Patients overall evaluation will be discussed with the transplant team and a final recommendation on the patients' suitability to be a kidney transplant donor will be made at that time.    Attestation:  This patient has been seen and evaluated by me, Denis Burris MD.  I have reviewed the note and agree with plan of care as documented by the fellow.       Consult:  Octavia Treviño was seen in consultation at the request of Dr. Mark Funes for evaluation as a potential kidney transplant donor.    Reason for Visit:  Octavia Treviño is a 27 year old female who presents for a kidney donor evaluation.  Patient would like to donate to husbands aunt.    HPI:    Patient  With a PMH of hypothyroidism and is on levothyroxine follows endocrinology  No nausea , vomiting , diarrhea , no ENT problems , mild headache today  No rash or skin problems           Kidney Disease Hx:       h/o Kidney Problems: No  Family h/o Genetic Kidney Disease: No       h/o HTN: No    Usual BP: 116/70       h/o Protein in Urine: No  h/o Blood in Urine: No       h/o Kidney Stones: No  h/o Kidney Injury: No       h/o Recurrent UTI: No  h/o Genitourinary Problems: No       h/o Chronic NSAID Use: No         Other Medical Hx:       h/o DM: No   h/o Gestational DM: No       h/o Preeclampsia: No          h/o Gastrointestinal, Pancreas or Liver Problems: No       h/o Lung or Heart Problems: No       h/o Hematologic Problems: No  h/o Bleeding or Clotting Problems: Post partum hemorrhage in the second pregnanacy       h/o Cancer: No       h/o Infection Problems: No         Skin Cancer Risk:       h/o more than 50 moles: No       h/o extensive sun exposure: No       h/o melanoma: No       Family h/o melanoma: melanoma in great grand aunt         Mental Health Assessment:       h/o Depression: No       h/o Psychiatric Illness: No       h/o Suicidal Attempt(s): No    ROS:   A comprehensive review of systems was obtained and negative,  "except as noted in the HPI or PMH.    PMH:   History was taken from the patient as noted below.  Past Medical History:   Diagnosis Date     Hypothyroidism        PSH:   Past Surgical History:   Procedure Laterality Date     NO HISTORY OF SURGERY       Personal or family history of anesthesia problems: No    Family Hx:   Family History   Problem Relation Age of Onset     Pacemaker Mother      Unknown/Adopted Father      Lupus Maternal Grandmother           Specific Family Hx:       FH of DM: No       FH of CAD: No  FH of HTN: No       FH of Cancer: melanoma in great grand aunt  FH of Kidney Cancer: No    Personal Hx:   Social History     Social History     Marital status:      Spouse name: N/A     Number of children: 2     Years of education: N/A     Occupational History           Social History Main Topics     Smoking status: Never Smoker     Smokeless tobacco: Never Used     Alcohol use No     Drug use: No     Sexual activity: Yes     Partners: Male     Birth control/ protection: Condom     Other Topics Concern     Not on file     Social History Narrative          Specific Social Hx:       Health Insurance Status: Yes       Employment Status: Part time  Occupation:                        Living Arrangements: lives with their spouse       Social Support: Yes       Presence of increased risk for disease transmission behaviors as defined by [facility] guidelines: No        Allergies:  No Known Allergies    Medications:  Prior to Admission medications    Medication Sig Start Date End Date Taking? Authorizing Provider   levothyroxine (SYNTHROID/LEVOTHROID) 137 MCG tablet Take 137 mcg by mouth daily 11/15/17 11/15/18 Yes Reported, Patient       Vitals:  Vital Signs 5/24/2018 5/24/2018 5/24/2018   Systolic 127 119 113   Diastolic 73 83 78   Pulse 64 - -   Temperature 97.6 - -   Respirations 18 - -   Weight (LB) 170 lb - -   Height 5' 5.354\" - -   BMI (Calculated) 28.04 - -   Pain - - -   O2 98 - - "       Exam:   GENERAL APPEARANCE: alert and no distress  HENT: mouth without ulcers or lesions  LYMPHATICS: no cervical or supraclavicular nodes  RESP: lungs clear to auscultation - no rales, rhonchi or wheezes  CV: regular rhythm, normal rate, no rub, no murmur  EDEMA: no LE edema bilaterally  ABDOMEN: soft, nondistended, nontender, bowel sounds normal  MS: extremities normal - no gross deformities noted, no evidence of inflammation in joints, no muscle tenderness  SKIN: no rash    Results:   Labs and imaging were ordered for this visit and reviewed by me.  Recent Results (from the past 336 hour(s))   EKG 12-lead complete w/read - Clinics    Collection Time: 05/24/18  6:58 AM   Result Value Ref Range    Interpretation ECG Click View Image link to view waveform and result    ABO/Rh type and screen    Collection Time: 05/24/18  8:25 AM   Result Value Ref Range    ABO O     RH(D) Pos     Antibody Screen Neg     Test Valid Only At          [facility]    Specimen Expires 05/27/2018    Hepatitis B core antibody    Collection Time: 05/24/18  8:26 AM   Result Value Ref Range    Hepatitis B Core Ivana Nonreactive NR^Nonreactive   Hepatitis B Surface Antibody    Collection Time: 05/24/18  8:26 AM   Result Value Ref Range    Hepatitis B Surface Antibody 383.25 (H) <8.00 m[IU]/mL   Hepatitis B surface antigen    Collection Time: 05/24/18  8:26 AM   Result Value Ref Range    Hep B Surface Agn Nonreactive NR^Nonreactive   Hepatitis C antibody    Collection Time: 05/24/18  8:26 AM   Result Value Ref Range    Hepatitis C Antibody Nonreactive NR^Nonreactive   HIV Antigen Antibody Combo Pretransplant    Collection Time: 05/24/18  8:26 AM   Result Value Ref Range    HIV Antigen Antibody Combo Pretransplant Nonreactive NR^Nonreactive   Treponema Abs w Reflex to RPR and Titer    Collection Time: 05/24/18  8:26 AM   Result Value Ref Range    Treponema Antibodies Nonreactive NR^Nonreactive    Lipid Profile    Collection Time: 05/24/18  8:26 AM   Result Value Ref Range    Cholesterol 185 <200 mg/dL    Triglycerides 74 <150 mg/dL    HDL Cholesterol 60 >49 mg/dL    LDL Cholesterol Calculated 110 (H) <100 mg/dL    Non HDL Cholesterol 125 <130 mg/dL   Comprehensive metabolic panel    Collection Time: 05/24/18  8:26 AM   Result Value Ref Range    Sodium 139 133 - 144 mmol/L    Potassium 4.0 3.4 - 5.3 mmol/L    Chloride 108 94 - 109 mmol/L    Carbon Dioxide 26 20 - 32 mmol/L    Anion Gap 5 3 - 14 mmol/L    Glucose 82 70 - 99 mg/dL    Urea Nitrogen 12 7 - 30 mg/dL    Creatinine 0.78 0.52 - 1.04 mg/dL    GFR Estimate 89 >60 mL/min/1.7m2    GFR Estimate If Black >90 >60 mL/min/1.7m2    Calcium 9.1 8.5 - 10.1 mg/dL    Bilirubin Total 0.4 0.2 - 1.3 mg/dL    Albumin 4.0 3.4 - 5.0 g/dL    Protein Total 7.2 6.8 - 8.8 g/dL    Alkaline Phosphatase 55 40 - 150 U/L    ALT 16 0 - 50 U/L    AST 14 0 - 45 U/L   HCG quantitative pregnancy    Collection Time: 05/24/18  8:26 AM   Result Value Ref Range    HCG Quantitative Serum <1 0 - 5 IU/L   Phosphorus    Collection Time: 05/24/18  8:26 AM   Result Value Ref Range    Phosphorus 3.5 2.5 - 4.5 mg/dL   Hemoglobin A1c    Collection Time: 05/24/18  8:26 AM   Result Value Ref Range    Hemoglobin A1C 4.7 0 - 5.6 %   INR    Collection Time: 05/24/18  8:26 AM   Result Value Ref Range    INR 1.01 0.86 - 1.14   Partial thromboplastin time    Collection Time: 05/24/18  8:26 AM   Result Value Ref Range    PTT 35 22 - 37 sec   CBC with platelets    Collection Time: 05/24/18  8:26 AM   Result Value Ref Range    WBC 7.4 4.0 - 11.0 10e9/L    RBC Count 4.21 3.8 - 5.2 10e12/L    Hemoglobin 12.9 11.7 - 15.7 g/dL    Hematocrit 37.8 35.0 - 47.0 %    MCV 90 78 - 100 fl    MCH 30.6 26.5 - 33.0 pg    MCHC 34.1 31.5 - 36.5 g/dL    RDW 12.3 10.0 - 15.0 %    Platelet Count 271 150 - 450 10e9/L   CMV Antibody IgG    Collection Time: 05/24/18  8:26 AM   Result Value Ref Range    CMV Antibody IgG 0.2 0.0  - 0.8 AI   EBV Capsid Antibody IgG    Collection Time: 05/24/18  8:26 AM   Result Value Ref Range    EBV Capsid Antibody IgG 6.8 (H) 0.0 - 0.8 AI   EBV Capsid Antibody IgM    Collection Time: 05/24/18  8:26 AM   Result Value Ref Range    EBV Capsid Antibody IgM <0.2 0.0 - 0.8 AI   M Tuberculosis by Quantiferon    Collection Time: 05/24/18  8:26 AM   Result Value Ref Range    M Tuberculosis Result Negative NEG^Negative    M Tuberculosis Antigen Value 0.00 IU/mL   Uric acid    Collection Time: 05/24/18  8:26 AM   Result Value Ref Range    Uric Acid 3.6 2.6 - 6.0 mg/dL   Strongyloides antibody IgG    Collection Time: 05/24/18  8:26 AM   Result Value Ref Range    Strongyloides Ivana IgG 0.22 <=0.99 IV   Trypanosoma Cruzi Antibody IgG    Collection Time: 05/24/18  8:26 AM   Result Value Ref Range    Trypano cruzi Ab G 0.3 <=1.0 IV   Routine UA with microscopic    Collection Time: 05/24/18  8:44 AM   Result Value Ref Range    Color Urine Straw     Appearance Urine Clear     Glucose Urine Negative NEG^Negative mg/dL    Bilirubin Urine Negative NEG^Negative    Ketones Urine Negative NEG^Negative mg/dL    Specific Gravity Urine 1.008 1.003 - 1.035    Blood Urine Small (A) NEG^Negative    pH Urine 5.0 5.0 - 7.0 pH    Protein Albumin Urine Negative NEG^Negative mg/dL    Urobilinogen mg/dL 0.0 0.0 - 2.0 mg/dL    Nitrite Urine Negative NEG^Negative    Leukocyte Esterase Urine Negative NEG^Negative    Source Midstream Urine     WBC Urine <1 0 - 5 /HPF    RBC Urine <1 0 - 2 /HPF    Squamous Epithelial /HPF Urine <1 0 - 1 /HPF    Mucous Urine Present (A) NEG^Negative /LPF   Albumin Random Urine Quantitative with Creat Ratio    Collection Time: 05/24/18  8:44 AM   Result Value Ref Range    Creatinine Urine 51 mg/dL    Albumin Urine mg/L <5 mg/L    Albumin Urine mg/g Cr Unable to calculate due to low value 0 - 25 mg/g Cr   Protein  random urine with Creat Ratio    Collection Time: 05/24/18  8:44 AM   Result Value Ref Range    Protein  Random Urine <0.05 g/L    Protein Total Urine g/gr Creatinine Unable to calculate due to low value 0 - 0.2 g/g Cr   Iohexol    Collection Time: 05/24/18 10:50 AM   Result Value Ref Range    Iohexol t1 8.94 mg/dL    Iohexol t2 4.08 mg/dL    Iohexol Body Surface Area 1.905 m2    Iohexol Raw Clearance 93 mL/min    Iohexol Std Clearance 85 /1.73 m2   CT Renal angio    Collection Time: 05/24/18  2:53 PM   Result Value Ref Range    Radiologist flags Splenic artery aneurysm

## 2018-05-24 NOTE — NURSING NOTE
Saw Octavia in clinic for kidney donor evaluation.    Came with her mother-in-law on 18  Has offered to be a kidney donor to her 's aunt.  She is has hypothyroidism and takes synthroid.   Discussed surgery, hospitalization, and recovery.  Discussed the next steps after evaluation, including living donor selection committee.  Octavia knows when and how to obtain evaluation results and the process for scheduling surgery.  Reviewed SRTR datasheet.  Signed consent for donor evaluation.    Donor Signed MAYO.  Donor Signed Financial shett.  Requested routine cancer screening tests:  Last pap smear date uncertain.  Will request.  Questions answered.  Approx. time spent:  45 minutes  Donor has medical insurance:  Yes      Living Kidney Donor Consent per OPTN Policy 14.3 for Transplant Coordinators    Written assurance has been obtained from the patient that the donor:   1) Is willing to donate  2) Is free from inducement and coercion  3) Has been informed that the donor may decline to donate at any time  4) Has been informed that transplant centers must:     A) Offer donors an opportunity to discontinue the donor consent or evaluation process in a way that is protected and confidential    B) Provide an independent donor advocate (GIANNA) to assist the potential donor during this process    The following was presented in a language in which donor is able to engage in meaningful dialogue and was reviewed and discussed with the patient by the transplant coordinator and the physician.     The following has been provided:   Education and instruction about all phases of the living donation process includin) Consent  2) The donor must undergo medical and psychosocial evaluations  3) Disclosure that the recovery hospital will take all reasonable precautions to provide confidentiality for the donor/recipient  4) Disclosure that it is a federal crime for any person to knowingly acquire, obtain or otherwise transfer any  human organ for valuable consideration  5) The transplant hospital may refuse the potential donor, and the donor could be evaluated by another transplant program with different selection criteria  6) Data from the most recent SRTR center-specific reports:     A) National 1-year patient and graft survival rates    B) Hospital s 1-year patient and graft survival rates    C) Notification about all CMS outcome requirements not being met by the recovery and recipient s hospitals    The following has been provided:   1) Education about expected post-donation kidney function and how chronic kidney disease and end-stage renal disease might potentially impact the donor in the future to include:    A) On average, donors will have 25-35% permanent loss of kidney function at donation    B) Baseline risk of End Stage Renal Disease (ESRD)  does not exceed that of members of general population with same demographic profile    C) Donor risks must be interpreted in light of known epidemiology of both Chronic Kidney Disease (CKD) or ESRD    D) CKD generally develops in midlife (40-50 years old)    E) ESRD generally develops after age 60    F) Medical evaluation of young potential donor cannot predict lifetime risk  2) Donors may be at higher risk for CKD if they sustain damage to the remaining kidney. 3) Development of CKD and progression to ESRD may be more rapid with only 1 kidney  4) Dialysis is required when reaching ESRD  5) Current practice prioritizes prior living kidney donors who became kidney transplant candidates  6) Alternate procedures or courses of treatment for the recipient, including  donor transplant    A) A  donor kidney may become available for the recipient before donor evaluation is complete or transplant occurs    B) Any transplant candidate may have risk factors for increased morbidity or mortality that are not disclosed to the potential donor  7) The patient will receive a thorough medical and  psychosocial evaluation  8) Health information obtained during the evaluation is subject to the same regulations as all records and could reveal conditions that must be reported to local, state, or federal public health authorities  9) The [facility] [place], is required to report living donor follow up information at 6, 12, and 24 months  10) Potential donors must commit to post operative follow up testing coordinated by the [facility]  11) Any infectious disease or malignancy pertinent to acute recipient care discovered during the potential donor s first two years of follow up care:    A) Will be disclosed to the donor    B) May need to be reported to local, state or federal public health authorities    C) Will be disclosed to their recipient s transplant center, and    D) Will be reported through the OPT Improving Patient Safety Portal    Disclosure has been provided that these risks may be transient or permanent & include but are not limited to potential medical or surgical risks:    Death    Scars, pain, fatigue, and other consequences typical of any surgical procedure    Decreased kidney function    Abdominal or bowel symptoms such as bloating and nausea, and developing bowel obstruction    Kidney failure and the need for dialysis or kidney transplant for the donor  Impact of obesity, hypertension or other donor-specific medical conditions on morbidity and mortality of the potential donor    Questions answered.  I will call Octavia next Wednesday to review committee recommendations.  Octavia knows how to get in contact with me with any further questions/concerns.   Niki Zafar RN, BSN, CCTN  Living Donor Coordinator  [phone]

## 2018-05-24 NOTE — MR AVS SNAPSHOT
After Visit Summary   5/24/2018    Octavia Treviño    MRN: [id number]           Patient Information     Date Of Birth          1990        Visit Information        Provider Department      5/24/2018 8:30 AM UC 46 ATC; UC SPEC INFUSION [facility] [place] Special[facility] Procedure        Today's Diagnoses     Transplant donor evaluation    -  1       Follow-ups after your visit        Your next 10 appointments already scheduled     May 24, 2018  3:00 PM CDT   CT RENAL ANGIO with UCCT2   [facility] [place] [facility] ([facility])    [address] [address] [phone]           Please bring any scans or X-rays taken at other hospitals, if similar tests were done. Also bring a list of your medicines, including vitamins, minerals and over-the-counter drugs. It is safest to leave personal items at home.  Be sure to tell your doctor:   If you have any allergies.   If there s any chance you are pregnant.   If you are breastfeeding.    If you have diabetes as your medication may need to be adjusted for this exam.  You will have contrast for this exam. To prepare:   Do not eat or drink for 2 hours before your exam. If you need to take medicine, you may take it with small sips of water. (We may ask you to take liquid medicine as well.)   The day before your exam, drink extra fluids at least six 8-ounce glasses (unless your doctor tells you to restrict your fluids).  Patients over 70 or patients with diabetes or kidney problems:   If you haven t had a blood test (creatinine test) within the last 30 days, the Cardiologist/Radiologist may require you to get this test prior to your exam.  Please wear loose clothing, such as a sweat suit or jogging clothes. Avoid snaps, zippers and other metal. We may ask you to undress and put on a hospital gown.  If you have any questions, please call the Imaging Department where you will have your  "exam.              Who to contact     If you have questions or need follow up information about today's clinic visit or your schedule please contact [facility] SPECIALTY AND PROCEDURE directly at [phone].  Normal or non-critical lab and imaging results will be communicated to you by MyChart, letter or phone within 4 business days after the clinic has received the results. If you do not hear from us within 7 days, please contact the clinic through MyChart or phone. If you have a critical or abnormal lab result, we will notify you by phone as soon as possible.  Submit refill requests through ReClaims or call your pharmacy and they will forward the refill request to us. Please allow 3 business days for your refill to be completed.          Additional Information About Your Visit        [employer] Information     ReClaims lets you send messages to your doctor, view your test results, renew your prescriptions, schedule appointments and more. To sign up, go to [email or web address] . Click on \"Log in\" on the left side of the screen, which will take you to the Welcome page. Then click on \"Sign up Now\" on the right side of the page.     You will be asked to enter the access code listed below, as well as some personal information. Please follow the directions to create your username and password.     Your access code is: [email or web address]  Expires: 2018  6:30 AM     Your access code will  in 90 days. If you need help or a new code, please call your [place] clinic or [phone].        Care EveryWhere ID     This is your Care EveryWhere ID. This could be used by other organizations to access your [place] medical records  [id number]         Blood Pressure from Last 3 Encounters:   18 113/78   16 111/86   13 115/78    Weight from Last 3 Encounters:   18 77.1 kg (170 lb)   16 74.8 kg (165 lb)   10/06/13 77.1 kg (170 lb)              We Performed the Following     " Iohexol          Today's Medication Changes          These changes are accurate as of 5/24/18  2:48 PM.  If you have any questions, ask your nurse or doctor.               These medicines have changed or have updated prescriptions.        Dose/Directions    levothyroxine 137 MCG tablet   Commonly known as:  SYNTHROID/LEVOTHROID   This may have changed:  Another medication with the same name was removed. Continue taking this medication, and follow the directions you see here.   Changed by:  Denis Burris MD        Dose:  137 mcg   Take 137 mcg by mouth daily   Refills:  0         Stop taking these medicines if you haven't already. Please contact your care team if you have questions.     dextromethorphan-guaiFENesin  MG per 12 hr tablet   Commonly known as:  MUCINEX DM   Stopped by:  Denis Burris MD           IBUPROFEN PO   Stopped by:  Denis Burris MD                    Primary Care Provider Office Phone # Fax #    Lisa [facility], [phone]       [address] [place] [address]        Equal Access to Services     [facility] AH: Hadii aad ku hadasho Soomaali, waaxda luqadaha, qaybta kaalmada adeegyada, waxay idiin hayaan ademarcia kharacornelia rowan . So [facility] [phone].    ATENCIÓN: Si habla español, tiene a fajardo disposición servicios gratuitos de asistencia lingüística. [facility] [phone].    We comply with applicable federal civil rights laws and [place] laws. We do not discriminate on the basis of race, color, national origin, age, disability, sex, sexual orientation, or gender identity.            Thank you!     Thank you for choosing [facility] SPECIALTY AND PROCEDURE  for your care. Our goal is always to provide you with excellent care. Hearing back from our patients is one way we can continue to improve our services. Please take a few minutes to complete the written survey that you may receive in the mail after your  visit with us. Thank you!             Your Updated Medication List - Protect others around you: Learn how to safely use, store and throw away your medicines at www.disposemymeds.org.          This list is accurate as of 5/24/18  2:48 PM.  Always use your most recent med list.                   Brand Name Dispense Instructions for use Diagnosis    levothyroxine 137 MCG tablet    SYNTHROID/LEVOTHROID     Take 137 mcg by mouth daily

## 2018-05-24 NOTE — MR AVS SNAPSHOT
After Visit Summary   5/24/2018    Octavia Treviño    MRN: [id number]           Patient Information     Date Of Birth          1990        Visit Information        Provider Department      5/24/2018 10:00 AM Dennise Zafar RN [facility] Solid Organ Transplant        Today's Diagnoses     Transplant donor evaluation    -  1       Follow-ups after your visit        Your next 10 appointments already scheduled     May 24, 2018 12:30 PM CDT   (Arrive by 12:15 PM)   Kidney Donor Evaluation with Mark Funes MD   [facility] Solid Organ Transplant ([facility])    [address] [place] [address]  [address]  [facility] [id number] [phone]            May 24, 2018  1:00 PM CDT   (Arrive by 12:30 PM)   Kidney Donor Evaluation with Denis Burris MD   [facility] Nephrology ([facility])    [address] [place] [address]  [address]  [facility] [id number] [phone]            May 24, 2018  2:00 PM CDT   NUTRITION VISIT with Susy Carrillo RD   [facility] Solid Organ Transplant ([facility])    [address] [place] [address]  [address]  [facility] [id number] [phone]            May 24, 2018  2:40 PM CDT   XR CHEST 2 VIEWS with UCXR1   [facility] Xray ([facility])    [address] [place] [address]  1st Floor  [facility] [id number] [phone]           Please bring a list of your current medicines to your exam. (Include vitamins, minerals and over-thecounter medicines.) Leave your valuables at home.  Tell your doctor if there is a chance you may be pregnant.  You do not need to do anything special for this exam.            May 24, 2018  3:00 PM CDT   CT RENAL ANGIO with UCCT2   [facility] CT ([facility])    [address] [place] [address]  1st Floor  [facility] [address] [phone]           Please bring any scans or X-rays taken at other hospitals, if similar  tests were done. Also bring a list of your medicines, including vitamins, minerals and over-the-counter drugs. It is safest to leave personal items at home.  Be sure to tell your doctor:   If you have any allergies.   If there s any chance you are pregnant.   If you are breastfeeding.    If you have diabetes as your medication may need to be adjusted for this exam.  You will have contrast for this exam. To prepare:   Do not eat or drink for 2 hours before your exam. If you need to take medicine, you may take it with small sips of water. (We may ask you to take liquid medicine as well.)   The day before your exam, drink extra fluids at least six 8-ounce glasses (unless your doctor tells you to restrict your fluids).  Patients over 70 or patients with diabetes or kidney problems:   If you haven t had a blood test (creatinine test) within the last 30 days, the Cardiologist/Radiologist may require you to get this test prior to your exam.  Please wear loose clothing, such as a sweat suit or jogging clothes. Avoid snaps, zippers and other metal. We may ask you to undress and put on a hospital gown.  If you have any questions, please call the Imaging Department where you will have your exam.              Who to contact     If you have questions or need follow up information about today's clinic visit or your schedule please contact [facility] SOLID ORGAN TRANSPLANT directly at [phone].  Normal or non-critical lab and imaging results will be communicated to you by MyChart, letter or phone within 4 business days after the clinic has received the results. If you do not hear from us within 7 days, please contact the clinic through ParkingCarmahart or phone. If you have a critical or abnormal lab result, we will notify you by phone as soon as possible.  Submit refill requests through Everstring or call your pharmacy and they will forward the refill request to us. Please allow 3 business days for your refill to be completed.           "Additional Information About Your Visit        MyChart Information     Circ[employer]ia lets you send messages to your doctor, view your test results, renew your prescriptions, schedule appointments and more. To sign up, go to [email or web address] . Click on \"Log in\" on the left side of the screen, which will take you to the Welcome page. Then click on \"Sign up Now\" on the right side of the page.     You will be asked to enter the access code listed below, as well as some personal information. Please follow the directions to create your username and password.     Your access code is: [email or web address]  Expires: 2018  6:30 AM     Your access code will  in 90 days. If you need help or a new code, please call your [place] clinic or [phone].        Care EveryWhere ID     This is your Care EveryWhere ID. This could be used by other organizations to access your [place] medical records  [id number]         Blood Pressure from Last 3 Encounters:   16 111/86   13 115/78   10/08/13 105/55    Weight from Last 3 Encounters:   16 74.8 kg (165 lb)   10/06/13 77.1 kg (170 lb)              Today, you had the following     No orders found for display         Today's Medication Changes          These changes are accurate as of 18 11:47 AM.  If you have any questions, ask your nurse or doctor.               These medicines have changed or have updated prescriptions.        Dose/Directions    levothyroxine 137 MCG tablet   Commonly known as:  SYNTHROID/LEVOTHROID   This may have changed:  Another medication with the same name was removed. Continue taking this medication, and follow the directions you see here.   Changed by:  Denis Burris MD        Dose:  137 mcg   Take 137 mcg by mouth daily   Refills:  0         Stop taking these medicines if you haven't already. Please contact your care team if you have questions.     dextromethorphan-guaiFENesin  MG per 12 hr tablet   Commonly known as:  " MUCINEX DM   Stopped by:  Denis Burris MD           IBUPROFEN PO   Stopped by:  Denis Burris MD                    Primary Care Provider Office Phone # Fax #    Lisa Gaviria [facility], [phone]       [facility] [address]        Equal Access to Services     : Hadii aad ku hadasho Soomaali, waaxda luqadaha, qaybta kaalmada adeegyada, waxay idiin hayaan adeeg kharash la'aan . So [facility] [phone].    ATENCIÓN: Si habla español, tiene a fajardo disposición servicios gratuitos de asistencia lingüística. [facility] [phone].    We comply with applicable federal civil rights laws and [place] laws. We do not discriminate on the basis of race, color, national origin, age, disability, sex, sexual orientation, or gender identity.            Thank you!     Thank you for choosing [facility] SOLID ORGAN TRANSPLANT  for your care. Our goal is always to provide you with excellent care. Hearing back from our patients is one way we can continue to improve our services. Please take a few minutes to complete the written survey that you may receive in the mail after your visit with us. Thank you!             Your Updated Medication List - Protect others around you: Learn how to safely use, store and throw away your medicines at www.disposemymeds.org.          This list is accurate as of 5/24/18 11:47 AM.  Always use your most recent med list.                   Brand Name Dispense Instructions for use Diagnosis    levothyroxine 137 MCG tablet    SYNTHROID/LEVOTHROID     Take 137 mcg by mouth daily

## 2018-05-24 NOTE — MR AVS SNAPSHOT
After Visit Summary   5/24/2018    Octavia Treviño    MRN: [id number]           Patient Information     Date Of Birth          1990        Visit Information        Provider Department      5/24/2018 12:30 PM Mark Funes MD [facility] Solid Organ Transplant        Today's Diagnoses     Transplant donor evaluation    -  1       Follow-ups after your visit        Your next 10 appointments already scheduled     May 24, 2018  2:00 PM CDT   NUTRITION VISIT with Susy Carrillo RD   [facility] Solid Organ Transplant ([facility])    [address] [facility]  [address]  [facility] [place] [address]   [phone]            May 24, 2018  2:40 PM CDT   XR CHEST 2 VIEWS with UCXR1   [facility] Xray ([facility])    [address] [place] [id number] [phone]           Please bring a list of your current medicines to your exam. (Include vitamins, minerals and over-thecounter medicines.) Leave your valuables at home.  Tell your doctor if there is a chance you may be pregnant.  You do not need to do anything special for this exam.            May 24, 2018  3:00 PM CDT   CT RENAL ANGIO with UCCT2   [facility] CT ([facility])    [address] [id number] [phone]           Please bring any scans or X-rays taken at other hospitals, if similar tests were done. Also bring a list of your medicines, including vitamins, minerals and over-the-counter drugs. It is safest to leave personal items at home.  Be sure to tell your doctor:   If you have any allergies.   If there s any chance you are pregnant.   If you are breastfeeding.    If you have diabetes as your medication may need to be adjusted for this exam.  You will have contrast for this exam. To prepare:   Do not eat or drink for 2 hours before your exam. If you need to take medicine, you may take it  "with small sips of water. (We may ask you to take liquid medicine as well.)   The day before your exam, drink extra fluids at least six 8-ounce glasses (unless your doctor tells you to restrict your fluids).  Patients over 70 or patients with diabetes or kidney problems:   If you haven t had a blood test (creatinine test) within the last 30 days, the Cardiologist/Radiologist may require you to get this test prior to your exam.  Please wear loose clothing, such as a sweat suit or jogging clothes. Avoid snaps, zippers and other metal. We may ask you to undress and put on a hospital gown.  If you have any questions, please call the Imaging Department where you will have your exam.              Who to contact     If you have questions or need follow up information about today's clinic visit or your schedule please contact [facility] SOLID ORGAN TRANSPLANT directly at [phone].  Normal or non-critical lab and imaging results will be communicated to you by CrossCorehart, letter or phone within 4 business days after the clinic has received the results. If you do not hear from us within 7 days, please contact the clinic through Leroy Brotherst or phone. If you have a critical or abnormal lab result, we will notify you by phone as soon as possible.  Submit refill requests through Social Solutions or call your pharmacy and they will forward the refill request to us. Please allow 3 business days for your refill to be completed.          Additional Information About Your Visit        [employer] Solutions Information     Social Solutions lets you send messages to your doctor, view your test results, renew your prescriptions, schedule appointments and more. To sign up, go to [email or web address] . Click on \"Log in\" on the left side of the screen, which will take you to the Welcome page. Then click on \"Sign up Now\" on the right side of the page.     You will be asked to enter the access code listed below, as well as some personal information. Please follow the " directions to create your username and password.     Your access code is: [email or web address]  Expires: 2018  6:30 AM     Your access code will  in 90 days. If you need help or a new code, please call your [place] clinic or [phone].        Care EveryWhere ID     This is your Care EveryWhere ID. This could be used by other organizations to access your [place] medical records  [id number]         Blood Pressure from Last 3 Encounters:   18 113/78   16 111/86   13 115/78    Weight from Last 3 Encounters:   18 77.1 kg (170 lb)   16 74.8 kg (165 lb)   10/06/13 77.1 kg (170 lb)              Today, you had the following     No orders found for display         Today's Medication Changes          These changes are accurate as of 18  1:54 PM.  If you have any questions, ask your nurse or doctor.               These medicines have changed or have updated prescriptions.        Dose/Directions    levothyroxine 137 MCG tablet   Commonly known as:  SYNTHROID/LEVOTHROID   This may have changed:  Another medication with the same name was removed. Continue taking this medication, and follow the directions you see here.   Changed by:  Denis Burris MD        Dose:  137 mcg   Take 137 mcg by mouth daily   Refills:  0         Stop taking these medicines if you haven't already. Please contact your care team if you have questions.     dextromethorphan-guaiFENesin  MG per 12 hr tablet   Commonly known as:  MUCINEX DM   Stopped by:  Denis Burris MD           IBUPROFEN PO   Stopped by:  Denis Burris MD                    Primary Care Provider Office Phone # Fax #    [facility], [phone]       [address] [place] [address]        Equal Access to Services     [facility] : Hadii irma castroo Sokristen, waaxda luqadaha, qaybta kaalmada colleenyada, star schmitt. So [facility]  [phone].    ATENCIÓN: Si jones zabala, tiene a fajardo disposición servicios gratuitos de asistencia lingüística. Hector al [phone].    We comply with applicable federal civil rights laws and [place] laws. We do not discriminate on the basis of race, color, national origin, age, disability, sex, sexual orientation, or gender identity.            Thank you!     Thank you for choosing [facility] SOLID ORGAN TRANSPLANT  for your care. Our goal is always to provide you with excellent care. Hearing back from our patients is one way we can continue to improve our services. Please take a few minutes to complete the written survey that you may receive in the mail after your visit with us. Thank you!             Your Updated Medication List - Protect others around you: Learn how to safely use, store and throw away your medicines at www.disposemymeds.org.          This list is accurate as of 5/24/18  1:54 PM.  Always use your most recent med list.                   Brand Name Dispense Instructions for use Diagnosis    levothyroxine 137 MCG tablet    SYNTHROID/LEVOTHROID     Take 137 mcg by mouth daily

## 2018-05-24 NOTE — MR AVS SNAPSHOT
After Visit Summary   5/24/2018    Octavia Treviño    MRN: [id number]           Patient Information     Date Of Birth          1990        Visit Information        Provider Department      5/24/2018 2:00 PM Susy Carrillo RD [facility] Solid Organ Transplant        Today's Diagnoses     Transplant donor evaluation    -  1       Follow-ups after your visit        Your next 10 appointments already scheduled     May 24, 2018 10:30 AM CDT   Lab with UC LAB   [facility] Lab ([facility])    [address] [place] [address]  1st Floor  [facility] [id number] [phone]            May 24, 2018 12:30 PM CDT   (Arrive by 12:15 PM)   Kidney Donor Evaluation with Mark Funes MD   [facility] Solid Organ Transplant ([facility])    [address] [place] [address]  [address]  [facility] [id number] [phone]            May 24, 2018  1:00 PM CDT   (Arrive by 12:30 PM)   Kidney Donor Evaluation with Tera Robertson MD   [facility] Nephrology ([facility])    [address] [place] [address]  [address]  [facility] [id number] [phone]            May 24, 2018  2:00 PM CDT   NUTRITION VISIT with Susy Carrillo RD   [facility] Solid Organ Transplant ([facility])    [address] [place] [address]  [address]  [facility] [id number] [phone]            May 24, 2018  2:40 PM CDT   XR CHEST 2 VIEWS with UCXR1   [facility] Xray ([facility])    [address] [place] [address]  1st [facility] [id number] [phone]           Please bring a list of your current medicines to your exam. (Include vitamins, minerals and over-thecounter medicines.) Leave your valuables at home.  Tell your doctor if there is a chance you may be pregnant.  You do not need to do anything special for this exam.            May 24, 2018  3:00 PM CDT   CT RENAL ANGIO with UCCT2   [facility] CT ([facility]  [facility])    [address] [facility]  1st [facility] [address] [phone]           Please bring any scans or X-rays taken at other hospitals, if similar tests were done. Also bring a list of your medicines, including vitamins, minerals and over-the-counter drugs. It is safest to leave personal items at home.  Be sure to tell your doctor:   If you have any allergies.   If there s any chance you are pregnant.   If you are breastfeeding.    If you have diabetes as your medication may need to be adjusted for this exam.  You will have contrast for this exam. To prepare:   Do not eat or drink for 2 hours before your exam. If you need to take medicine, you may take it with small sips of water. (We may ask you to take liquid medicine as well.)   The day before your exam, drink extra fluids at least six 8-ounce glasses (unless your doctor tells you to restrict your fluids).  Patients over 70 or patients with diabetes or kidney problems:   If you haven t had a blood test (creatinine test) within the last 30 days, the Cardiologist/Radiologist may require you to get this test prior to your exam.  Please wear loose clothing, such as a sweat suit or jogging clothes. Avoid snaps, zippers and other metal. We may ask you to undress and put on a hospital gown.  If you have any questions, please call the Imaging Department where you will have your exam.              Who to contact     If you have questions or need follow up information about today's clinic visit or your schedule please contact [facility]ANSPL[facility] directly at [phone].  Normal or non-critical lab and imaging results will be communicated to you by MyChart, letter or phone within 4 business days after the clinic has received the results. If you do not hear from us within 7 days, please contact the clinic through MyChart or phone. If you have a critical or abnormal lab result, we will notify you by phone as soon as  "possible.  Submit refill requests through [facility] or call your pharmacy and they will forward the refill request to us. Please allow 3 business days for your refill to be completed.          Additional Information About Your Visit        [facility] Information     [facility] lets you send messages to your doctor, view your test results, renew your prescriptions, schedule appointments and more. To sign up, go to [email or web address] . Click on \"Log in\" on the left side of the screen, which will take you to the Welcome page. Then click on \"Sign up Now\" on the right side of the page.     You will be asked to enter the access code listed below, as well as some personal information. Please follow the directions to create your username and password.     Your access code is: [email or web address]  Expires: 2018  6:30 AM     Your access code will  in 90 days. If you need help or a new code, please call your [facility] clinic or [phone].        Care EveryWhere ID     This is your Care EveryWhere ID. This could be used by other organizations to access your [facility] medical records  [id number]         Blood Pressure from Last 3 Encounters:   18 119/83   16 111/86   13 115/78    Weight from Last 3 Encounters:   18 77.1 kg (170 lb)   16 74.8 kg (165 lb)   10/06/13 77.1 kg (170 lb)              Today, you had the following     No orders found for display         Today's Medication Changes          These changes are accurate as of 18 10:10 AM.  If you have any questions, ask your nurse or doctor.               These medicines have changed or have updated prescriptions.        Dose/Directions    levothyroxine 137 MCG tablet   Commonly known as:  SYNTHROID/LEVOTHROID   This may have changed:  Another medication with the same name was removed. Continue taking this medication, and follow the directions you see here.   Changed by:  Tera Robertson MD        Dose:  137 mcg   Take 137 mcg by mouth " daily   Refills:  0         Stop taking these medicines if you haven't already. Please contact your care team if you have questions.     dextromethorphan-guaiFENesin  MG per 12 hr tablet   Commonly known as:  MUCINEX DM   Stopped by:  Tera Robertson MD           IBUPROFEN PO   Stopped by:  Tera Robertson MD                    Primary Care Provider Office Phone # Fax #    Lisa Gaviria [place], [phone]       [address] [place] [address]        Equal Access to Services     [facility]: Hadii aad ku hadasho Soomaali, waaxda luqadaha, qaybta kaalmada adeegyada, waxay idiin hayaan adeeg karl rowan . So [facility] [place] [facility] [phone].    ATENCIÓN: Si habla español, tiene a fajardo disposición servicios gratuitos de asistencia lingüística. [facility] [phone].    We comply with applicable federal civil rights laws and [place] laws. We do not discriminate on the basis of race, color, national origin, age, disability, sex, sexual orientation, or gender identity.            Thank you!     Thank you for choosing [facility]GAN [facility]ANSPL[facility]  for your care. Our goal is always to provide you with excellent care. Hearing back from our patients is one way we can continue to improve our services. Please take a few minutes to complete the written survey that you may receive in the mail after your visit with us. Thank you!             Your Updated Medication List - Protect others around you: Learn how to safely use, store and throw away your medicines at www.disposemymeds.org.          This list is accurate as of 5/24/18 10:10 AM.  Always use your most recent med list.                   Brand Name Dispense Instructions for use Diagnosis    levothyroxine 137 MCG tablet    SYNTHROID/LEVOTHROID     Take 137 mcg by mouth daily

## 2018-05-24 NOTE — MR AVS SNAPSHOT
After Visit Summary   5/24/2018    Octavia Treviño    MRN: [id number]           Patient Information     Date Of Birth          1990        Visit Information        Provider Department      5/24/2018 9:00 AM Brooke Leyva [facility] Solid Organ Transplant        Today's Diagnoses     Transplant donor evaluation    -  1       Follow-ups after your visit        Your next 10 appointments already scheduled     May 24, 2018 12:30 PM CDT   (Arrive by 12:15 PM)   Kidney Donor Evaluation with Mark Funes MD   [facility] Solid Organ Transplant ([facility])    [address] [place] [address]  [address]  [facility] [id number] [phone]            May 24, 2018  1:00 PM CDT   (Arrive by 12:30 PM)   Kidney Donor Evaluation with Tera Robertson MD   [facility] Nephrology ([facility])    [address] [place] [address]  [address]  [facility] [id number] [phone]            May 24, 2018  2:00 PM CDT   NUTRITION VISIT with Susy Carrillo RD   [facility] Solid Organ Transplant ([facility])    [address] [place] [address]  [address]  [facility] [id number] [phone]            May 24, 2018  2:40 PM CDT   XR CHEST 2 VIEWS with UCXR1   [facility] Xray ([facility])    [address] [place] [address]  1st Floor  [facility] [id number] [phone]           Please bring a list of your current medicines to your exam. (Include vitamins, minerals and over-thecounter medicines.) Leave your valuables at home.  Tell your doctor if there is a chance you may be pregnant.  You do not need to do anything special for this exam.            May 24, 2018  3:00 PM CDT   CT RENAL ANGIO with UCCT2   [facility] CT ([facility])    [address] [place] [address]  1st Floor  [facility] [address] [phone]           Please bring any scans or X-rays taken at other hospitals, if  similar tests were done. Also bring a list of your medicines, including vitamins, minerals and over-the-counter drugs. It is safest to leave personal items at home.  Be sure to tell your doctor:   If you have any allergies.   If there s any chance you are pregnant.   If you are breastfeeding.    If you have diabetes as your medication may need to be adjusted for this exam.  You will have contrast for this exam. To prepare:   Do not eat or drink for 2 hours before your exam. If you need to take medicine, you may take it with small sips of water. (We may ask you to take liquid medicine as well.)   The day before your exam, drink extra fluids at least six 8-ounce glasses (unless your doctor tells you to restrict your fluids).  Patients over 70 or patients with diabetes or kidney problems:   If you haven t had a blood test (creatinine test) within the last 30 days, the Cardiologist/Radiologist may require you to get this test prior to your exam.  Please wear loose clothing, such as a sweat suit or jogging clothes. Avoid snaps, zippers and other metal. We may ask you to undress and put on a hospital gown.  If you have any questions, please call the Imaging Department where you will have your exam.              Future tests that were ordered for you today     Open Future Orders        Priority Expected Expires Ordered    Trypanosoma Cruzi Add-On 5/24/2018 5/31/2018 5/24/2018            Who to contact     If you have questions or need follow up information about today's clinic visit or your schedule please contact [facility]PL[facility] directly at [phone].  Normal or non-critical lab and imaging results will be communicated to you by MyChart, letter or phone within 4 business days after the clinic has received the results. If you do not hear from us within 7 days, please contact the clinic through MyChart or phone. If you have a critical or abnormal lab result, we will notify you by phone as soon as  "possible.  Submit refill requests through [employer] or call your pharmacy and they will forward the refill request to us. Please allow 3 business days for your refill to be completed.          Additional Information About Your Visit        [employer] Information     [employer] lets you send messages to your doctor, view your test results, renew your prescriptions, schedule appointments and more. To sign up, go to [email or web address] . Click on \"Log in\" on the left side of the screen, which will take you to the Welcome page. Then click on \"Sign up Now\" on the right side of the page.     You will be asked to enter the access code listed below, as well as some personal information. Please follow the directions to create your username and password.     Your access code is: [email or web address]  Expires: 2018  6:30 AM     Your access code will  in 90 days. If you need help or a new code, please call your [facility] clinic or [phone].        Care EveryWhere ID     This is your Care EveryWhere ID. This could be used by other organizations to access your [facility] medical records  [id number]         Blood Pressure from Last 3 Encounters:   16 111/86   13 115/78   10/08/13 105/55    Weight from Last 3 Encounters:   16 74.8 kg (165 lb)   10/06/13 77.1 kg (170 lb)              Today, you had the following     No orders found for display         Today's Medication Changes          These changes are accurate as of 18 11:11 AM.  If you have any questions, ask your nurse or doctor.               These medicines have changed or have updated prescriptions.        Dose/Directions    levothyroxine 137 MCG tablet   Commonly known as:  SYNTHROID/LEVOTHROID   This may have changed:  Another medication with the same name was removed. Continue taking this medication, and follow the directions you see here.   Changed by:  Tera Robertson MD        Dose:  137 mcg   Take 137 mcg by mouth daily   Refills:  0       "   Stop taking these medicines if you haven't already. Please contact your care team if you have questions.     dextromethorphan-guaiFENesin  MG per 12 hr tablet   Commonly known as:  MUCINEX DM   Stopped by:  Tera Robertson MD           IBUPROFEN PO   Stopped by:  Tera Robertson MD                    Primary Care Provider Office Phone # Fax #    Lisa Gaviria [place], [phone]       [facility] [address]        Equal Access to Services     [facility]: Hadii aad ku hadasho Soomaali, waaxda luqadaha, qaybta kaalmada adeegyada, waxay idiin hayaan adeeg triaracornelia rowan . So [facility] [phone].    ATENCIÓN: Si habla español, tiene a fajardo disposición servicios gratuitos de asistencia lingüística. [facility] [phone].    We comply with applicable federal civil rights laws and [place] laws. We do not discriminate on the basis of race, color, national origin, age, disability, sex, sexual orientation, or gender identity.            Thank you!     Thank you for choosing [facility]GAN TRANSPL[facility]  for your care. Our goal is always to provide you with excellent care. Hearing back from our patients is one way we can continue to improve our services. Please take a few minutes to complete the written survey that you may receive in the mail after your visit with us. Thank you!             Your Updated Medication List - Protect others around you: Learn how to safely use, store and throw away your medicines at www.disposemymeds.org.          This list is accurate as of 5/24/18 11:11 AM.  Always use your most recent med list.                   Brand Name Dispense Instructions for use Diagnosis    levothyroxine 137 MCG tablet    SYNTHROID/LEVOTHROID     Take 137 mcg by mouth daily

## 2018-05-24 NOTE — DISCHARGE INSTRUCTIONS

## 2018-05-24 NOTE — PROGRESS NOTES
Psychosocial Evaluation  Living Organ Donation per OPTN Policy 14.1.A  Organ Type: unrelated, kidney  Presenting Information:  Ms. Octavia Treviño presents to the [facility] to complete a psychosocial evaluation since she is interested in becoming a kidney donor to her aunt through marriage, Ms. Pooja Alvarez, age 54.  Ms. Treviño is a 27 year old, , white, American, female.  She is a [place] Citizen.  Her mother-in-law is here with her today.  PERSONAL BACKGROUND:  Current Living Situation: Ms. Treviño lives with her , their 3 children, and her mother-in-law in a single family home in [place], [place].    Education/Employment/Financial Situation: Ms. Treviño completed some college education, but did not finish a degree.  She works full time as a , and has for 7 years.  Her  works full time for a manufacturing company.  She has health care insurance.  She does not have any paid time off, but she feel quite confident that they can make ends meet during her recovery period with her 's income.  She denies that she is worried about suffering a significant financial burden as a result of kidney donation.    Family History: Ms. Treviño is .  She has been  for 2 years.  She has a 8 year old step son from her 's previous marriage.  A 6 year old son from her previous marriage, and a 3 year old daughter from her current marriage.  Her mother-in-law lives in the home with them.      General Health: Ms. Treviño considers herself to be in good general health.  She denies any past surgical history.    Mental Health: Denies any past or present treatment for mental health issues.  Denies any need to see a counselor or therapist.  Denies any past suicidal ideation, plans, or past attempts.  Denies any use of psychotropic medications.  Denies any past history of hospitalization for psychiatric illness.    Alcohol and Drug  Use/Abuse/Dependency: Denies any past history of abuse or dependency on alcohol or illicit drugs. Denies any current use of street drugs, including marijuana.  Denies any past history of negative consequences of use of alcohol or drugs such as a DUI, relationship problems, or problems with work or home life.       Cigarette Use: Denies every smoking any cigarettes.    Legal: denies any legal issues.    Coping Strategies/Support System: Ms. Treviño's mother in law lives with she and her  and is willing and able to provide care for her post surgery.  She also provides care for their three children.    DONOR SPECIFIC INFORMATION:  Relationship to Recipient: Ms. Treviño wants to donate a kidney to her aunt, Ms. Pooja Alvarez, age 54.  She states that she does not know the cause of Pooja's kidney disease, but does know that she has been on dialysis for 2 years.    Decision Process/Motivation to Donate: Ms. Treviño is motivated to donate a kidney to her aunt through marriage, Ms. Pooja Alvarez, age 54.  She tells me that Ms. Alvarez has been through some really hard things in her life, and she feels strongly that she deserves to have something go well.  She really wants to be able to help her live a longer and healthier life free from dialysis.  She denies feeling any pressure or coercion.  She denies being offered any form of payment to be a donor.    PREPARATION FOR DONATION, RECOVERY, AND POTENTIAL SHORT-LONG-TERM OUTCOMES:  Understanding of the Living Donation Process:   We discussed the role of the donor  and Independent Donor Advocate.  Short and long term medical and psychosocial risks to both, donor and recipient were reviewed and she appears to understand these risks.  High risk behaviors as defined by [place] Public Health Services (PHS) 2013 that have potential to increase risk of disease transmission were reviewed and she denies any high risk behaviors. Post surgical restrictions (2 weeks no  driving, 6 weeks no lifting over 10 lbs) were reviewed and she appears capable of adhering to the post surgical requirements. The need for a caregiver was discussed and she has her mother-in-law and  to help her post surgery .  The risk of poor psychosocial outcome including problems with body image, post-surgery depression or anxiety, or feelings of emotional distress or bereavement if recipient experiences any recurrent disease, poor outcome or death was reviewed.  Additionally, potential financial implications, including the risk of having difficulty obtaining health care insurance, life insurance, disability insurance, or long term care insurance were reviewed, as were available donor grants to assist with donor related expenses.      We also discussed some unique issues that arise with paired kidney donation, which include the uncertainty of the timing and the importance of having a employment situation and support system that is able to provide sustained support and flexibility.    Ms. Treviño appears capable of understanding this information and making an informed medical decision.    Impressions/Recommendations:   Ms. Octavia Treviño  is highly motivated to donate kidney  to her aunt, Pooja Alvarez, age 54.  Her decision to donate is free of inducement, coercion, or other undue pressure.   Her housing, finances and employment are stable.  No current/active mental health or chemical abuse issues were identified.  The need for a caregiver was reviewed and she is able to identify a plan to meet her post operative care needs.  Ms. Treviño appears capable of making an informed medical decision.  No psychosocial contraindications to living organ donation were identified and  I support Ms. Treviño s desire to donate a kidney to her aunt, Ms. Pooja Alvarez, age 54.       Contact Information:   MAXX Castro, [facility]  Clinical  and Independent Donor Advocate  [facility]  Pager:  [phone]  Direct:  [phone]    Time Spent: 60 minutes      Living Kidney Donor Consent per OPTN Policy 14.3.A for Independent Living Donor Advocate (ANGELITA)    Written assurance has been obtained from the potential donor that he/she:   Is willing to donate  Is free from inducement and coercion  Has been informed that the he/she may decline to donate at any time  Has been informed that transplant centers must:   A) Offer donors an opportunity to discontinue the donor consent or evaluation process in a way that is protected and confidential  B) Provide an independent living donor advocate (ANGELITA) to assist the potential donor during this process    The following was presented to the potential donor in a language in which the potential donor is able to engage in meaningful dialogue:   Education and instruction about all phases of the living donation process including:   Consent  Medical and psychosocial evaluations  Pre and post operative care  Required post operative follow up  Disclosure that the [facility] hospital will take all reasonable precautions to provide confidentiality for the donor/recipient  Disclosure that it is a federal crime for any person to knowingly acquire, obtain or otherwise transfer any human organ for valuable consideration  Disclosure that the [facility] hospital must provide an independent living donor advocate (ANGELITA)  Disclosure that health information obtained during the evaluation is subject to the same regulations as all records and could reveal conditions that must be reported to local, state, or federal public health authorities  Disclosure that the [facility] hospital is required to report living donor follow up information at 6 months, 1 year, and 2 years, and that the potential donor must commit to post operative follow up testing coordinated by the [facility] hospital    Disclosure has been provided that these risks may be transient or permanent &  include but are not limited to:  Potential psychosocial risks:  Problems with body image  Post-surgery depression or anxiety  Feelings of emotional distress or bereavement if recipient experiences any recurrent disease or in the event of the recipient s death  Impact of donation on the donor s lifestyle    Potential financial impacts:  Personal expenses of travel, housing, , lost wages related to donation might not be reimbursed. However, resources may be available to defray some donation-related expenses   Need for life-long follow up at the donor s expense  Loss of employment or income  Negative impact on the ability to obtain future employment  Negative impact on the ability to obtain, maintain, or afford health, disability, and life insurance  Future health problems experienced by living donors following donation may not be covered by the recipient s insurance    Contact Information:  MAXX Castro, [facility]  Clinical  and Independent Donor Advocate  [facility]  Pager:  [phone]  Direct:  [phone]    Time Spent: 60 minutes

## 2018-05-24 NOTE — LETTER
5/24/2018       RE: Octavia Treviño  [address] [facility] [address]     Dear Colleague,    Thank you for referring your patient, Octavia Treviño, to the [facility]. Please see a copy of my visit note below.    Assessment and Plan:  1. Prospective kidney transplant donor with no issues that need to be addressed prior to donation. Patient's blood pressure is acceptable at this visit, kidney function appears to be acceptable with Iohexol pending, and urinalysis is bland.    Discussed the risks of donating a kidney, including the surgical risk and the possible risks of living with one kidney.    Education about expected post-donation kidney function and how chronic kidney disease (CKD) and end stage kidney disease (ESKD) might potentially impact the donor in the future, include, but not limited to:       - On average, donors will have 25-35% permanent loss of kidney function at donation.       - Baseline risk of ESKD may slightly exceed that of members of the general population with the same demographic profile.       - Donor risks must be interpreted in light of known epidemiology of both CKD or ESKD, such as that CKD generally develops in midlife (40-50 years old) and ESKD generally develops after age 60.       - Medical evaluation of young potential donors cannot predict lifetime risk of CKD or ESKD.       - Donors may be at higher risk for CKD if they sustain damage to the remaining kidney.       - Development of CKD and progression of ESKD may be more rapid with only 1 kidney.       - Some type of kidney replacement therapy, either kidney transplant or dialysis, is required when reaching ESKD.    Potential medical or surgical risks include, but not limited to:       - Death.       - Scars, pain, fatigue, and other consequences typical of any surgical procedure.       - Decreased kidney function.       - Abdominal or bowel symptoms, such as bloating  and nausea, and developing bowel obstruction.       - Kidney failure (ESKD) and the need for a kidney transplant or dialysis for the donor.       - Impact of obesity, hypertension, or other donor-specific medical conditions on morbidity and mortality of the potential donor.    Patients overall evaluation will be discussed with the transplant team and a final recommendation on the patients' suitability to be a kidney transplant donor will be made at that time.    Attestation:  This patient has been seen and evaluated by me, Denis Burris MD.  I have reviewed the note and agree with plan of care as documented by the fellow.       Consult:  Octavia Treviño was seen in consultation at the request of Dr. Mark Funes for evaluation as a potential kidney transplant donor.    Reason for Visit:  Octavia Treviño is a 27 year old female who presents for a kidney donor evaluation.  Patient would like to donate to husbands aunt.    HPI:    Patient  With a PMH of hypothyroidism and is on levothyroxine follows endocrinology  No nausea , vomiting , diarrhea , no ENT problems , mild headache today  No rash or skin problems           Kidney Disease Hx:       h/o Kidney Problems: No  Family h/o Genetic Kidney Disease: No       h/o HTN: No    Usual BP: 116/70       h/o Protein in Urine: No  h/o Blood in Urine: No       h/o Kidney Stones: No  h/o Kidney Injury: No       h/o Recurrent UTI: No  h/o Genitourinary Problems: No       h/o Chronic NSAID Use: No         Other Medical Hx:       h/o DM: No   h/o Gestational DM: No       h/o Preeclampsia: No          h/o Gastrointestinal, Pancreas or Liver Problems: No       h/o Lung or Heart Problems: No       h/o Hematologic Problems: No  h/o Bleeding or Clotting Problems: Post partum hemorrhage in the second pregnanacy       h/o Cancer: No       h/o Infection Problems: No         Skin Cancer Risk:       h/o more than 50 moles: No       h/o extensive sun exposure: No       h/o  melanoma: No       Family h/o melanoma: melanoma in great grand aunt         Mental Health Assessment:       h/o Depression: No       h/o Psychiatric Illness: No       h/o Suicidal Attempt(s): No    ROS:   A comprehensive review of systems was obtained and negative, except as noted in the HPI or PMH.    PMH:   History was taken from the patient as noted below.  Past Medical History:   Diagnosis Date     Hypothyroidism        PSH:   Past Surgical History:   Procedure Laterality Date     NO HISTORY OF SURGERY       Personal or family history of anesthesia problems: No    Family Hx:   Family History   Problem Relation Age of Onset     Pacemaker Mother      Unknown/Adopted Father      Lupus Maternal Grandmother           Specific Family Hx:       FH of DM: No       FH of CAD: No  FH of HTN: No       FH of Cancer: melanoma in great grand aunt  FH of Kidney Cancer: No    Personal Hx:   Social History     Social History     Marital status:      Spouse name: N/A     Number of children: 2     Years of education: N/A     Occupational History           Social History Main Topics     Smoking status: Never Smoker     Smokeless tobacco: Never Used     Alcohol use No     Drug use: No     Sexual activity: Yes     Partners: Male     Birth control/ protection: Condom     Other Topics Concern     Not on file     Social History Narrative          Specific Social Hx:       Health Insurance Status: Yes       Employment Status: Part time  Occupation: [employer]                       Living Arrangements: lives with their spouse       Social Support: Yes       Presence of increased risk for disease transmission behaviors as defined by PHS guidelines: No        Allergies:  No Known Allergies    Medications:  Prior to Admission medications    Medication Sig Start Date End Date Taking? Authorizing Provider   levothyroxine (SYNTHROID/LEVOTHROID) 137 MCG tablet Take 137 mcg by mouth daily 11/15/17 11/15/18 Yes Reported, Patient  "      Vitals:  Vital Signs 5/24/2018 5/24/2018 5/24/2018   Systolic 127 119 113   Diastolic 73 83 78   Pulse 64 - -   Temperature 97.6 - -   Respirations 18 - -   Weight (LB) 170 lb - -   Height 5' 5.354\" - -   BMI (Calculated) 28.04 - -   Pain - - -   O2 98 - -       Exam:   GENERAL APPEARANCE: alert and no distress  HENT: mouth without ulcers or lesions  LYMPHATICS: no cervical or supraclavicular nodes  RESP: lungs clear to auscultation - no rales, rhonchi or wheezes  CV: regular rhythm, normal rate, no rub, no murmur  EDEMA: no LE edema bilaterally  ABDOMEN: soft, nondistended, nontender, bowel sounds normal  MS: extremities normal - no gross deformities noted, no evidence of inflammation in joints, no muscle tenderness  SKIN: no rash    Results:   Labs and imaging were ordered for this visit and reviewed by me.  Recent Results (from the past 336 hour(s))   EKG 12-lead complete w/read - Clinics    Collection Time: 05/24/18  6:58 AM   Result Value Ref Range    Interpretation ECG Click View Image link to view waveform and result    ABO/Rh type and screen    Collection Time: 05/24/18  8:25 AM   Result Value Ref Range    ABO O     RH(D) Pos     Antibody Screen Neg     Test Valid Only At          [facility]    Specimen Expires 05/27/2018    Hepatitis B core antibody    Collection Time: 05/24/18  8:26 AM   Result Value Ref Range    Hepatitis B Core Ivana Nonreactive NR^Nonreactive   Hepatitis B Surface Antibody    Collection Time: 05/24/18  8:26 AM   Result Value Ref Range    Hepatitis B Surface Antibody 383.25 (H) <8.00 m[IU]/mL   Hepatitis B surface antigen    Collection Time: 05/24/18  8:26 AM   Result Value Ref Range    Hep B Surface Agn Nonreactive NR^Nonreactive   Hepatitis C antibody    Collection Time: 05/24/18  8:26 AM   Result Value Ref Range    Hepatitis C Antibody Nonreactive NR^Nonreactive   HIV Antigen Antibody Combo Pretransplant    Collection Time: 05/24/18  " 8:26 AM   Result Value Ref Range    HIV Antigen Antibody Combo Pretransplant Nonreactive NR^Nonreactive   Treponema Abs w Reflex to RPR and Titer    Collection Time: 05/24/18  8:26 AM   Result Value Ref Range    Treponema Antibodies Nonreactive NR^Nonreactive   Lipid Profile    Collection Time: 05/24/18  8:26 AM   Result Value Ref Range    Cholesterol 185 <200 mg/dL    Triglycerides 74 <150 mg/dL    HDL Cholesterol 60 >49 mg/dL    LDL Cholesterol Calculated 110 (H) <100 mg/dL    Non HDL Cholesterol 125 <130 mg/dL   Comprehensive metabolic panel    Collection Time: 05/24/18  8:26 AM   Result Value Ref Range    Sodium 139 133 - 144 mmol/L    Potassium 4.0 3.4 - 5.3 mmol/L    Chloride 108 94 - 109 mmol/L    Carbon Dioxide 26 20 - 32 mmol/L    Anion Gap 5 3 - 14 mmol/L    Glucose 82 70 - 99 mg/dL    Urea Nitrogen 12 7 - 30 mg/dL    Creatinine 0.78 0.52 - 1.04 mg/dL    GFR Estimate 89 >60 mL/min/1.7m2    GFR Estimate If Black >90 >60 mL/min/1.7m2    Calcium 9.1 8.5 - 10.1 mg/dL    Bilirubin Total 0.4 0.2 - 1.3 mg/dL    Albumin 4.0 3.4 - 5.0 g/dL    Protein Total 7.2 6.8 - 8.8 g/dL    Alkaline Phosphatase 55 40 - 150 U/L    ALT 16 0 - 50 U/L    AST 14 0 - 45 U/L   HCG quantitative pregnancy    Collection Time: 05/24/18  8:26 AM   Result Value Ref Range    HCG Quantitative Serum <1 0 - 5 IU/L   Phosphorus    Collection Time: 05/24/18  8:26 AM   Result Value Ref Range    Phosphorus 3.5 2.5 - 4.5 mg/dL   Hemoglobin A1c    Collection Time: 05/24/18  8:26 AM   Result Value Ref Range    Hemoglobin A1C 4.7 0 - 5.6 %   INR    Collection Time: 05/24/18  8:26 AM   Result Value Ref Range    INR 1.01 0.86 - 1.14   Partial thromboplastin time    Collection Time: 05/24/18  8:26 AM   Result Value Ref Range    PTT 35 22 - 37 sec   CBC with platelets    Collection Time: 05/24/18  8:26 AM   Result Value Ref Range    WBC 7.4 4.0 - 11.0 10e9/L    RBC Count 4.21 3.8 - 5.2 10e12/L    Hemoglobin 12.9 11.7 - 15.7 g/dL    Hematocrit 37.8 35.0 -  47.0 %    MCV 90 78 - 100 fl    MCH 30.6 26.5 - 33.0 pg    MCHC 34.1 31.5 - 36.5 g/dL    RDW 12.3 10.0 - 15.0 %    Platelet Count 271 150 - 450 10e9/L   CMV Antibody IgG    Collection Time: 05/24/18  8:26 AM   Result Value Ref Range    CMV Antibody IgG 0.2 0.0 - 0.8 AI   EBV Capsid Antibody IgG    Collection Time: 05/24/18  8:26 AM   Result Value Ref Range    EBV Capsid Antibody IgG 6.8 (H) 0.0 - 0.8 AI   EBV Capsid Antibody IgM    Collection Time: 05/24/18  8:26 AM   Result Value Ref Range    EBV Capsid Antibody IgM <0.2 0.0 - 0.8 AI   M Tuberculosis by Quantiferon    Collection Time: 05/24/18  8:26 AM   Result Value Ref Range    M Tuberculosis Result Negative NEG^Negative    M Tuberculosis Antigen Value 0.00 IU/mL   Uric acid    Collection Time: 05/24/18  8:26 AM   Result Value Ref Range    Uric Acid 3.6 2.6 - 6.0 mg/dL   Strongyloides antibody IgG    Collection Time: 05/24/18  8:26 AM   Result Value Ref Range    Strongyloides Ivana IgG 0.22 <=0.99 IV   Trypanosoma Cruzi Antibody IgG    Collection Time: 05/24/18  8:26 AM   Result Value Ref Range    Trypano cruzi Ab G 0.3 <=1.0 IV   Routine UA with microscopic    Collection Time: 05/24/18  8:44 AM   Result Value Ref Range    Color Urine Straw     Appearance Urine Clear     Glucose Urine Negative NEG^Negative mg/dL    Bilirubin Urine Negative NEG^Negative    Ketones Urine Negative NEG^Negative mg/dL    Specific Gravity Urine 1.008 1.003 - 1.035    Blood Urine Small (A) NEG^Negative    pH Urine 5.0 5.0 - 7.0 pH    Protein Albumin Urine Negative NEG^Negative mg/dL    Urobilinogen mg/dL 0.0 0.0 - 2.0 mg/dL    Nitrite Urine Negative NEG^Negative    Leukocyte Esterase Urine Negative NEG^Negative    Source Midstream Urine     WBC Urine <1 0 - 5 /HPF    RBC Urine <1 0 - 2 /HPF    Squamous Epithelial /HPF Urine <1 0 - 1 /HPF    Mucous Urine Present (A) NEG^Negative /LPF   Albumin Random Urine Quantitative with Creat Ratio    Collection Time: 05/24/18  8:44 AM   Result Value  Ref Range    Creatinine Urine 51 mg/dL    Albumin Urine mg/L <5 mg/L    Albumin Urine mg/g Cr Unable to calculate due to low value 0 - 25 mg/g Cr   Protein  random urine with Creat Ratio    Collection Time: 05/24/18  8:44 AM   Result Value Ref Range    Protein Random Urine <0.05 g/L    Protein Total Urine g/gr Creatinine Unable to calculate due to low value 0 - 0.2 g/g Cr   Iohexol    Collection Time: 05/24/18 10:50 AM   Result Value Ref Range    Iohexol t1 8.94 mg/dL    Iohexol t2 4.08 mg/dL    Iohexol Body Surface Area 1.905 m2    Iohexol Raw Clearance 93 mL/min    Iohexol Std Clearance 85 /1.73 m2   CT Renal angio    Collection Time: 05/24/18  2:53 PM   Result Value Ref Range    Radiologist flags Splenic artery aneurysm              Again, thank you for allowing me to participate in the care of your patient.      Sincerely,    Denis Burris MD

## 2018-05-24 NOTE — PROGRESS NOTES
Donor Iohexol test    Octavia Treviño presents today to [facility] for a Donor Iohexol test.      Progress note:  ID verified by name and .     The following information was verified with the patient:  Female Patients is there any possibility of being pregnant no  Is there a history of allergy (skin rash, swelling, ect) to:   A.  Iodine (except skin reactions to betadine): No   B. Intravenous radio-contrast agents: No   C. Seafood No    R.N. provided patient with educational handout regarding timed test. Yes    20 gauge PIV placed in left AC.  Iohexol administered.  Following iohexol administration extension set replaced.  PIV left in place for blood draws and CT this afternoon    Medication administered:  Iohexol (Omnipaque 300mg iodine/ml concentration) 5 mls.    Start time: 0848  Stop time: 0850    Drug Waste Record    Drug Name: iohexol  Dose: 5ml  Route administered: IV  NDC #: 0407-1413-10  Amount of waste(mL):5ml  Reason for waste: Single use vial    Administrations This Visit     iohexol (OMNIPAQUE 300) injection 5 mL     Admin Date Action Dose Route Administered By             2018 Given 5 mL Intravenous Samara Grover RN                              Evaluation nurse in transplant to draw labs at 2 and 4 hours post iohexol administration.  Patient given a slip with the times to get labs drawn and verbalized understanding of the plan.    Patient tolerated the procedure:  Yes    After the infusion patient was discharged to the next appointment.    Samara Grover RN

## 2018-05-25 ENCOUNTER — TELEPHONE (OUTPATIENT)
Dept: INTERVENTIONAL RADIOLOGY/VASCULAR | Facility: CLINIC | Age: 28
End: 2018-05-25

## 2018-05-25 LAB
CMV IGG SERPL QL IA: 0.2 AI (ref 0–0.8)
EBV VCA IGG SER QL IA: 6.8 AI (ref 0–0.8)
EBV VCA IGM SER QL IA: <0.2 AI (ref 0–0.8)
INTERPRETATION ECG - MUSE: NORMAL
M TB TUBERC IFN-G BLD QL: NEGATIVE
M TB TUBERC IFN-G/MITOGEN IGNF BLD: 0 IU/ML
T PALLIDUM AB SER QL: NONREACTIVE

## 2018-05-25 NOTE — TELEPHONE ENCOUNTER
I called and spoke with Octavia.  She was unaware of the incidental findings from her CTA.  I have given her my contact info to help get her set up for clinic consultation with Dr. Easley to discuss possible treatment options for splenic artery aneurysm.  She would like to wait and talk with Dr. Robertson's office first.  LINUS Aparicio RN, BSN  Interventional Radiology Care Coordinator   Phone:  [phone]

## 2018-05-26 LAB
STRONGYLOIDES IGG SER IA-ACNC: 0.22 IV
T CRUZI IGG SER IA-ACNC: 0.3 IV

## 2018-05-29 ENCOUNTER — TELEPHONE (OUTPATIENT)
Dept: TRANSPLANT | Facility: CLINIC | Age: 28
End: 2018-05-29

## 2018-05-29 ENCOUNTER — TELEPHONE (OUTPATIENT)
Dept: INTERVENTIONAL RADIOLOGY/VASCULAR | Facility: CLINIC | Age: 28
End: 2018-05-29

## 2018-05-29 ENCOUNTER — OFFICE VISIT (OUTPATIENT)
Dept: INTERVENTIONAL RADIOLOGY/VASCULAR | Facility: CLINIC | Age: 28
End: 2018-05-29
Payer: COMMERCIAL

## 2018-05-29 VITALS
OXYGEN SATURATION: 98 % | WEIGHT: 170 LBS | HEART RATE: 73 BPM | DIASTOLIC BLOOD PRESSURE: 76 MMHG | BODY MASS INDEX: 27.98 KG/M2 | SYSTOLIC BLOOD PRESSURE: 115 MMHG

## 2018-05-29 DIAGNOSIS — I72.8 SPLENIC ARTERY ANEURYSM (H): Primary | ICD-10-CM

## 2018-05-29 LAB
BSA: 1.91 M2
IOHEXOL CL UR+SERPL-VRATE: 4.08 MG/DL
IOHEXOL CL UR+SERPL-VRATE: 8.94 MG/DL
IOHEXOL CL UR+SERPL-VRATE: 85 /1.73 M2
IOHEXOL CL UR+SERPL-VRATE: 93 ML/MIN

## 2018-05-29 ASSESSMENT — ENCOUNTER SYMPTOMS
MYALGIAS: 0
BACK PAIN: 1
ARTHRALGIAS: 0
NECK PAIN: 0
JOINT SWELLING: 0
MUSCLE CRAMPS: 0
MUSCLE WEAKNESS: 0
STIFFNESS: 0

## 2018-05-29 NOTE — TELEPHONE ENCOUNTER
I called and left a voicemail including my call back number.  I have her scheduled for splenic artery aneurysm embolization and stent for Monday, June 4th @ 1230 with a 11 am check in to [facility] waiting room, [facility].  NPO 8 hours prior. I will update her when she returns my call.  LINUS Aparicio RN, BSN  Interventional Radiology Care Coordinator   Phone:  [phone]

## 2018-05-29 NOTE — TELEPHONE ENCOUNTER
Received a staff message re: Octavia's abnormal CT findin.2 cm splenic artery aneurysm.  Octavia was aware of the results.  The IR clinic actually called her before a provider could call and explain her results.  She verbalized frustration but also relief about her incidental finding.  She will be seen in the IR clinic today for evaluation.  I told her I would call her tomorrow after living donor selection meeting where we would review all the findings from her evaluation. Octavia knows how to get in touch with me if she has any questions before that time.     Niki Zafar RN, BSN, CCTN  Living Donor Coordinator  [phone]

## 2018-05-29 NOTE — MR AVS SNAPSHOT
After Visit Summary   5/29/2018    Octavia Treviño    MRN: [id number]           Patient Information     Date Of Birth          1990        Visit Information        Provider Department      5/29/2018 1:00 PM Beto Easley MD [facility] Interventional Radiology        Today's Diagnoses     Splenic artery aneurysm (H)    -  1       Follow-ups after your visit        Your next 10 appointments already scheduled     Jun 01, 2018  1:00 PM CDT   MRA ANGIOGRAM BRAIN AND MRI BRAIN W/O CONTRAST with [facility] MRI ([facility] [place])    [address] [address] [phone]           Take your medicines as usual, unless your doctor tells you not to. Bring a list of your current medicines to your exam (including vitamins, minerals and over-the-counter drugs). Also bring the results of similar scans you may have had.  Please remove any body piercings and hair extensions before you arrive.  Follow your doctor s orders. If you do not, we may have to postpone your exam.  You may or may not receive IV contrast for this exam pending the discretion of the Radiologist.  You do not need to do anything special to prepare.  The MRI machine uses a strong magnet. Please wear clothes without metal (snaps, zippers). A sweatsuit works well, or we may give you a hospital gown.   **IMPORTANT** THE INSTRUCTIONS BELOW ARE ONLY FOR THOSE PATIENTS WHO HAVE BEEN PRESCRIBED SEDATION OR GENERAL ANESTHESIA DURING THEIR MRI PROCEDURE:  IF YOUR DOCTOR PRESCRIBED ORAL SEDATION (take medicine to help you relax during your exam):   You must get the medicine from your doctor (oral medication) before you arrive. Bring the medicine to the exam. Do not take it at home. You ll be told when to take it upon arriving for your exam.   Arrive one hour early. Bring someone who can take you home after the test. Your medicine will make you sleepy. After the exam, you may not  drive, take a bus or take a taxi by yourself.  IF YOUR DOCTOR PRESCRIBED IV SEDATION:   Arrive one hour early. Bring someone who can take you home after the test. Your medicine will make you sleepy. After the exam, you may not drive, take a bus or take a taxi by yourself.   No eating 6 hours before your exam. You may have clear liquids up until 4 hours before your exam. (Clear liquids include water, clear tea, black coffee and fruit juice without pulp.)  IF YOUR DOCTOR PRESCRIBED ANESTHESIA (be asleep for your exam):   Arrive 1 1/2 hours early. Bring someone who can take you home after the test. You may not drive, take a bus or take a taxi by yourself.   No eating 8 hours before your exam. You may have clear liquids up until 4 hours before your exam. (Clear liquids include water, clear tea, black coffee and fruit juice without pulp.)   You will spend four to five hours in the recovery room.  Please call the Imaging Department at your exam site with any questions.            Jun 01, 2018  2:00 PM CDT   US LOWER EXTREMITY VENOUS DUPLEX BILATERAL with [id number]   [facility] US ([facility])    [address] [address] [phone]           Please bring a list of your medicines (including vitamins, minerals and over-the-counter drugs). Also, tell your doctor about any allergies you may have. Wear comfortable clothes and leave your valuables at home.  You do not need to do anything special to prepare for your exam.  Please call the Imaging Department at your exam site with any questions.            Jun 04, 2018 11:00 AM CDT   Procedure 6.5 hour with U2A ROOM 10   [address] [facility] ([facility])    [address] [facility] [address]               Jun 04, 2018 12:30 PM CDT   IR PERIPHERAL VISCERAL EMBOLIZATION with UUIR4   [facility] ([facility],  [facility])    [address] [facility] [address] [phone]           1. You will need to have had a history and physical exam within 7 days of the procedure. 2. Laboratory test are to be obtained by your doctor prior to the exam (CBCP, INR and PTT) 3. Someone will need to drive you to and from the hospital. 4. If you are or may be pregnant, contact your doctor or a Radiology nurse prior to the day of the exam. 5. If you have diabetes, check with your doctor or a Radiology nurse to see if your insulin needs to be adjusted for the exam. 6. If you are taking Coumadin (to thin you blood) please contact your doctor or a Radiology nurse at least 3 days before the exam for special instructions. 7. The day before your exam you may eat your regular diet and are encouraged to drink at least 2 quarts of clear liquids. Drink no alcoholic beverages for 24 hours prior to the exam. 8. Do not eat any solid food or milk products for 6 hours prior to the exam. You may drink clear liquids until 2 hours prior to the exam. Clear liquids include the following: water, Jell-O, clear broth, apple juice or any noncarbonated drink that you can see through (no pop!) 9. The morning of the exam you may brush your teeth and take medications as directed with a sip of water. 10. Tell the Radiology nurse if you have any allergies.              Who to contact     Please call your clinic at [phone] to:    Ask questions about your health    Make or cancel appointments    Discuss your medicines    Learn about your test results    Speak to your doctor            Additional Information About Your Visit        HotDesk Information     HotDesk is an electronic gateway that provides easy, online access to your medical records. With HotDesk, you can request a clinic appointment, read your test results, renew a prescription or communicate with your care team.     To sign up for HotDesk visit the website at  [email or web address]   You will be asked to enter the access code listed below, as well as some personal information. Please follow the directions to create your username and password.     Your access code is: [email or web address]  Expires: 2018  6:30 AM     Your access code will  in 90 days. If you need help or a new code, please contact your [facility] or call [phone] for assistance.        Care EveryWhere ID     This is your Care EveryWhere ID. This could be used by other organizations to access your [facility] medical records  [id number]        Your Vitals Were     Pulse Pulse Oximetry BMI (Body Mass Index)             73 98% 27.98 kg/m2          Blood Pressure from Last 3 Encounters:   18 115/76   18 113/78   16 111/86    Weight from Last 3 Encounters:   18 77.1 kg (170 lb)   18 77.1 kg (170 lb)   16 74.8 kg (165 lb)               Primary Care Provider Office Phone # Fax #    [facility], [phone]       [address] [place] [address]        Equal Access to Services     RODRIGO GOMEZ AH: Hadii irma morillo hadasho Soomaali, waaxda luqadaha, qaybta kaalmada adeegyada, star schmitt. So [facility] [phone].    ATENCIÓN: Si habla español, tiene a fajardo disposición servicios gratuitos de asistencia lingüística. Llame al [phone].    We comply with applicable federal civil rights laws and [place] laws. We do not discriminate on the basis of race, color, national origin, age, disability, sex, sexual orientation, or gender identity.            Thank you!     Thank you for choosing [facility] INTERVENTIONAL RADIOLOGY  for your care. Our goal is always to provide you with excellent care. Hearing back from our patients is one way we can continue to improve our services. Please take a few minutes to complete the written survey that you may receive in the mail after your visit with us.  Thank you!             Your Updated Medication List - Protect others around you: Learn how to safely use, store and throw away your medicines at www.disposemymeds.org.          This list is accurate as of 5/29/18 11:59 PM.  Always use your most recent med list.                   Brand Name Dispense Instructions for use Diagnosis    levothyroxine 137 MCG tablet    SYNTHROID/LEVOTHROID     Take 137 mcg by mouth daily

## 2018-05-29 NOTE — LETTER
5/29/2018     RE: Octavia Treviño  [address] [facility] [address]     Dear Colleague,    Thank you for referring your patient, Octavia Treviño, to the [facility] INTERVENTIONAL RADIOLOGY at [facility]. Please see a copy of my visit note below.        INTERVENTIONAL RADIOLOGY CONSULTATION    Clinic Visit:  Date on this visit: 5/30/2018  Primary Physician: [facility], [place]     Octavia Treviño is referred by Dr. Robertson for treatment recommendations regarding incidental splenic artery aneurysm     History Of Present Illness:  Octavia Treviño is a 27 year old female who presents with incidental finding of splenic artery aneurysm.   Patient is under evaluation as a kidney transplant donor for her 's aunt. She does not have any symptoms. Her PMH only significant for hypothyroidism (on levothyroxine). Her kidney function is normal and her evaluation as a kidney transplantation donor is currently on hold pending splenic artery aneurysm treatment and also re-collection of UA, given last specimen had small amount of blood in it as it was collected during her menses. Her review of systems was only positive for intermittent back pain.     Past Medical/Surgical History:  Past Medical History:   Diagnosis Date     Hypothyroidism      Past Surgical History:   Procedure Laterality Date     NO HISTORY OF SURGERY       Family History:  Family History   Problem Relation Age of Onset     Pacemaker Mother      Unknown/Adopted Father      Lupus Maternal Grandmother      Social History:  Social History     Social History     Marital status:      Spouse name: N/A     Number of children: 2     Years of education: N/A     Occupational History           Social History Main Topics     Smoking status: Never Smoker     Smokeless tobacco: Never Used     Alcohol use No     Drug use: No     Sexual activity: Yes     Partners: Male     Birth control/ protection: Condom     Other  Topics Concern     Not on file     Social History Narrative     Review of Systems:  A 12 point review of systems was obtained and is negative other than as outlined above.    Current Medications:  Current Outpatient Prescriptions   Medication Sig Dispense Refill     levothyroxine (SYNTHROID/LEVOTHROID) 137 MCG tablet Take 137 mcg by mouth daily         Physical Exam:  /76  Pulse 73  Wt 77.1 kg (170 lb)  SpO2 98%  BMI 27.98 kg/m2   GENERAL APPEARANCE: alert, in no distress  HEENT: normocephalic.  Neck supple  Neck: no JVD  Resp: nonlabored.  CTAb  CV: RRR.  S1 & S2.  No rub  Abd: nontender, soft, normoactive bowel sounds  Lymph/ext: no pedal edema, bilateral radial, PT and DP pulses are symmetrical and 2/2.  Neuro: Oriented x3.  No evidence of focal motor deficits  Mood: appropriate affect         Imaging:   I reviewed CTA on 5/24/2018 and CT on 8/11/2013. There is a mid-distal splenic artery aneurysm measuring at 1.7 x 1.2 x 2.2 cm. There is one main inflow to the aneurysm and two outflow, one small posterior and another larger anterior outflow arteries. No other visceral aneurysms. Kidney normal. No splenomegaly. No splenic infarct or lesions.       ASSESSMENT/PLAN:   This is a 27 year old female with no significant past medical history who was found to have an incidental splenic artery aneurysm measuring up to 2.2 cm on 5/24/18, previously 1 cm in 2013. She is asymptomatic and is being evaluated as a kidney transplant donor to 's aunt.     The age/sex, presence of growth and also the size (>2 cm) of the splenic artery aneurysm would indicate intervention. The aneurysm has a relatively complex outflow which will be taken into consideration at the time of intervention.  We will attempt to embolize the smaller outflow artery and deploy a stent graft to preserve flow as our initial strategy.  If this is not feasible well embolize front door-back door to prevent the possibility of rupture.  We did  discuss at length the risks and benefits, as well as options including surgical repair vs splenectomy, endovascular vessel preserving methods vs embolization and watchful waiting.  She opted to undergo endovascular treatment with understanding of the benefits/ risks including post embolization syndrome, splenic infarction, splenic abscess, future immune deficiency and need for vaccination.  She is very concerned about the possibility of having other aneurysms and hence we'll proceed with arterial duplex of the lower extremities and H&N MRA.    Patient will be contacted after scheduling her for splenic artery aneurysm stenting/embolization.    A total of 45 minutes was spent in care for the patient, of which >50% was spent in counseling.     It was a pleasure to participate in patient's care care today.    Beto Easley MD  Asst. Prof., Vascular and Interventional Radiology  [facility]     Ebenezer Boyd MD. MPH  Vascular and Interventional Radiology Fellow   Pager [phone]    Physician Attestation   I, Beto Easley, saw this patient and agree with the findings and plan of care as documented in the note.      Items personally reviewed/procedural attestation: vitals, labs, imaging and agree with the interpretation documented in the note and personally performed the interview and physical exam and agree with the interpretation documented in the note.    Beto Easley MD    CC  Patient Care Team:  [facility], [place], MD as PCP - General (Family Practice)  Pebbles Choudhury MD (Internal Medicine)  Beto Easley MD as MD (Radiology)  TOMMIE HOLM

## 2018-05-30 ENCOUNTER — TELEPHONE (OUTPATIENT)
Dept: TRANSPLANT | Facility: CLINIC | Age: 28
End: 2018-05-30

## 2018-05-30 LAB
A* LOCUS NMDP: NORMAL
A* LOCUS: NORMAL
A* NMDP: NORMAL
ABTEST METHOD: NORMAL
B* LOCUS NMDP: NORMAL
B* LOCUS: NORMAL
B* NMDP: NORMAL
B*: NORMAL
BW-1: NORMAL
BW-2: NORMAL
C* LOCUS NMDP: NORMAL
C* LOCUS: NORMAL
C* NMDP: NORMAL
DPA1* NMDP: NORMAL
DPA1*: NORMAL
DPB1* LOCUS NMDP: NORMAL
DPB1* NMDP: NORMAL
DPB1*: NORMAL
DPB1*LOCUS: NORMAL
DQA1*: NORMAL
DQA1*LOCUS NMDP: NORMAL
DQA1*LOCUS: NORMAL
DQA1*NMDP: NORMAL
DQB1* LOCUS NMDP: NORMAL
DQB1* LOCUS: NORMAL
DQB1* NMDP: NORMAL
DQB1*: NORMAL
DRB1* LOCUS NMDP: NORMAL
DRB1* LOCUS: NORMAL
DRB1* NMDP: NORMAL
DRB1*: NORMAL
DRB3* LOCUS NMDP: NORMAL
DRB3* LOCUS: NORMAL
DRB3* NMDP: NORMAL
DRB3*: NORMAL
DRSSO TEST METHOD: NORMAL
T CRUZI IGM SER IA-ACNC: NORMAL

## 2018-05-30 NOTE — TELEPHONE ENCOUNTER
Called Octavia to review the results of her Living Donor Committee discussion from this morning.  We talked about her abnormal results:     2.2 cm splenic artery aneurysm found on abdominal CT --> she has already been seen in the IR clinic and is scheduled for embolization and stent placement 6/4/18.  This does not prohibit her from donation but we will need to ensure she has recovered from the procedure before we schedule her for donation    Small blood on urinalysis, patient was on her menses at time of collection--> re-collect at pre-op per protocol    Needs current negative pap smear      I told Octavia that if she would like to continue in the donor evaluation she would need to complete the above.      Octavia remains interested and motivated to continue in the donor evaluation, but would first like to talk things over with her family and recover from her IR procedure next week.  We plan on connecting next week.    Questions answered.  Patient knows how to reach out with additional questions/concerns.      Niki Zafar RN, BSN, CCTN  Living Donor Coordinator  [phone]

## 2018-05-31 NOTE — PROGRESS NOTES
INTERVENTIONAL RADIOLOGY CONSULTATION    Clinic Visit:  Date on this visit: 5/30/2018  Primary Physician: [facility], [place]     Octavia Treviño is referred by Dr. Robertson for treatment recommendations regarding incidental splenic artery aneurysm     History Of Present Illness:  Octavia Treviño is a 27 year old female who presents with incidental finding of splenic artery aneurysm.   Patient is under evaluation as a kidney transplant donor for her 's aunt. She does not have any symptoms. Her PMH only significant for hypothyroidism (on levothyroxine). Her kidney function is normal and her evaluation as a kidney transplantation donor is currently on hold pending splenic artery aneurysm treatment and also re-collection of UA, given last specimen had small amount of blood in it as it was collected during her menses. Her review of systems was only positive for intermittent back pain.     Past Medical/Surgical History:  Past Medical History:   Diagnosis Date     Hypothyroidism      Past Surgical History:   Procedure Laterality Date     NO HISTORY OF SURGERY       Family History:  Family History   Problem Relation Age of Onset     Pacemaker Mother      Unknown/Adopted Father      Lupus Maternal Grandmother      Social History:  Social History     Social History     Marital status:      Spouse name: N/A     Number of children: 2     Years of education: N/A     Occupational History           Social History Main Topics     Smoking status: Never Smoker     Smokeless tobacco: Never Used     Alcohol use No     Drug use: No     Sexual activity: Yes     Partners: Male     Birth control/ protection: Condom     Other Topics Concern     Not on file     Social History Narrative     Review of Systems:  A 12 point review of systems was obtained and is negative other than as outlined above.    Current Medications:  Current Outpatient Prescriptions   Medication Sig Dispense Refill     levothyroxine  (SYNTHROID/LEVOTHROID) 137 MCG tablet Take 137 mcg by mouth daily         Physical Exam:  /76  Pulse 73  Wt 77.1 kg (170 lb)  SpO2 98%  BMI 27.98 kg/m2   GENERAL APPEARANCE: alert, in no distress  HEENT: normocephalic.  Neck supple  Neck: no JVD  Resp: nonlabored.  CTAb  CV: RRR.  S1 & S2.  No rub  Abd: nontender, soft, normoactive bowel sounds  Lymph/ext: no pedal edema, bilateral radial, PT and DP pulses are symmetrical and 2/2.  Neuro: Oriented x3.  No evidence of focal motor deficits  Mood: appropriate affect         Imaging:   I reviewed CTA on 5/24/2018 and CT on 8/11/2013. There is a mid-distal splenic artery aneurysm measuring at 1.7 x 1.2 x 2.2 cm. There is one main inflow to the aneurysm and two outflow, one small posterior and another larger anterior outflow arteries. No other visceral aneurysms. Kidney normal. No splenomegaly. No splenic infarct or lesions.       ASSESSMENT/PLAN:   This is a 27 year old female with no significant past medical history who was found to have an incidental splenic artery aneurysm measuring up to 2.2 cm on 5/24/18, previously 1 cm in 2013. She is asymptomatic and is being evaluated as a kidney transplant donor to 's aunt.     The age/sex, presence of growth and also the size (>2 cm) of the splenic artery aneurysm would indicate intervention. The aneurysm has a relatively complex outflow which will be taken into consideration at the time of intervention.  We will attempt to embolize the smaller outflow artery and deploy a stent graft to preserve flow as our initial strategy.  If this is not feasible well embolize front door-back door to prevent the possibility of rupture.  We did discuss at length the risks and benefits, as well as options including surgical repair vs splenectomy, endovascular vessel preserving methods vs embolization and watchful waiting.  She opted to undergo endovascular treatment with understanding of the benefits/ risks including post  embolization syndrome, splenic infarction, splenic abscess, future immune deficiency and need for vaccination.  She is very concerned about the possibility of having other aneurysms and hence we'll proceed with arterial duplex of the lower extremities and H&N MRA.    Patient will be contacted after scheduling her for splenic artery aneurysm stenting/embolization.    A total of 45 minutes was spent in care for the patient, of which >50% was spent in counseling.     It was a pleasure to participate in patient's care care today.    Beto Easley MD  Asst. Prof., Vascular and Interventional Radiology  [facility]     Ebenezer Boyd MD. MPH  Vascular and Interventional Radiology Fellow   Pager [phone]    Physician Attestation   I, Beto Easley, saw this patient and agree with the findings and plan of care as documented in the note.      Items personally reviewed/procedural attestation: vitals, labs, imaging and agree with the interpretation documented in the note and personally performed the interview and physical exam and agree with the interpretation documented in the note.    Beto Easley MD      CC  Patient Care Team:  [facility], [place], MD as PCP - General (Family Practice)  Pebbles Choudhury MD (Internal Medicine)  Beto Easley MD as MD (Radiology)  TOMMIE HOLM

## 2018-06-04 ENCOUNTER — APPOINTMENT (OUTPATIENT)
Dept: MEDSURG UNIT | Facility: CLINIC | Age: 28
End: 2018-06-04
Attending: RADIOLOGY
Payer: COMMERCIAL

## 2018-06-04 ENCOUNTER — APPOINTMENT (OUTPATIENT)
Dept: INTERVENTIONAL RADIOLOGY/VASCULAR | Facility: CLINIC | Age: 28
End: 2018-06-04
Attending: RADIOLOGY
Payer: COMMERCIAL

## 2018-06-04 ENCOUNTER — HOSPITAL ENCOUNTER (OUTPATIENT)
Facility: CLINIC | Age: 28
Setting detail: OBSERVATION
Discharge: HOME | End: 2018-06-05
Attending: RADIOLOGY | Admitting: RADIOLOGY
Payer: COMMERCIAL

## 2018-06-04 DIAGNOSIS — I72.8 SPLENIC ARTERY ANEURYSM (H): ICD-10-CM

## 2018-06-04 PROBLEM — D73.5 SPLENIC INFARCT: Status: ACTIVE | Noted: 2018-06-04

## 2018-06-04 LAB
ANION GAP SERPL CALCULATED.3IONS-SCNC: 9 MMOL/L (ref 3–14)
B-HCG SERPL-ACNC: <1 IU/L (ref 0–5)
BUN SERPL-MCNC: 16 MG/DL (ref 7–30)
CALCIUM SERPL-MCNC: 8.7 MG/DL (ref 8.5–10.1)
CHLORIDE SERPL-SCNC: 105 MMOL/L (ref 94–109)
CO2 SERPL-SCNC: 24 MMOL/L (ref 20–32)
CREAT SERPL-MCNC: 0.65 MG/DL (ref 0.52–1.04)
ERYTHROCYTE [DISTWIDTH] IN BLOOD BY AUTOMATED COUNT: 12.6 % (ref 10–15)
GFR SERPL CREATININE-BSD FRML MDRD: >90 ML/MIN/1.7M2
GLUCOSE SERPL-MCNC: 89 MG/DL (ref 70–99)
HCT VFR BLD AUTO: 39.1 % (ref 35–47)
HGB BLD-MCNC: 13 G/DL (ref 11.7–15.7)
INR PPP: 1.06 (ref 0.86–1.14)
MCH RBC QN AUTO: 30 PG (ref 26.5–33)
MCHC RBC AUTO-ENTMCNC: 33.2 G/DL (ref 31.5–36.5)
MCV RBC AUTO: 90 FL (ref 78–100)
PLATELET # BLD AUTO: 300 10E9/L (ref 150–450)
POTASSIUM SERPL-SCNC: 3.9 MMOL/L (ref 3.4–5.3)
RBC # BLD AUTO: 4.33 10E12/L (ref 3.8–5.2)
SODIUM SERPL-SCNC: 139 MMOL/L (ref 133–144)
WBC # BLD AUTO: 5.8 10E9/L (ref 4–11)

## 2018-06-04 PROCEDURE — 25000132 ZZH RX MED GY IP 250 OP 250 PS 637: Performed by: RADIOLOGY

## 2018-06-04 PROCEDURE — C1874 STENT, COATED/COV W/DEL SYS: HCPCS

## 2018-06-04 PROCEDURE — 27210808 ZZH SHEATH CR7

## 2018-06-04 PROCEDURE — 37243 VASC EMBOLIZE/OCCLUDE ORGAN: CPT

## 2018-06-04 PROCEDURE — 25000128 H RX IP 250 OP 636: Performed by: RADIOLOGY

## 2018-06-04 PROCEDURE — C1887 CATHETER, GUIDING: HCPCS

## 2018-06-04 PROCEDURE — 27210780 ZZH KIT CR3

## 2018-06-04 PROCEDURE — 99218 ZZC INITIAL OBSERVATION CARE,LEVL I: CPT | Mod: AI | Performed by: INTERNAL MEDICINE

## 2018-06-04 PROCEDURE — 27210732 ZZH ACCESSORY CR1

## 2018-06-04 PROCEDURE — 36247 INS CATH ABD/L-EXT ART 3RD: CPT

## 2018-06-04 PROCEDURE — C1769 GUIDE WIRE: HCPCS

## 2018-06-04 PROCEDURE — 85610 PROTHROMBIN TIME: CPT | Performed by: RADIOLOGY

## 2018-06-04 PROCEDURE — 84702 CHORIONIC GONADOTROPIN TEST: CPT | Performed by: RADIOLOGY

## 2018-06-04 PROCEDURE — 25000125 ZZHC RX 250: Performed by: RADIOLOGY

## 2018-06-04 PROCEDURE — G0378 HOSPITAL OBSERVATION PER HR: HCPCS

## 2018-06-04 PROCEDURE — 27210908 ZZH NEEDLE CR4

## 2018-06-04 PROCEDURE — 27210804 ZZH SHEATH CR3

## 2018-06-04 PROCEDURE — 37244 VASC EMBOLIZE/OCCLUDE BLEED: CPT

## 2018-06-04 PROCEDURE — C1760 CLOSURE DEV, VASC: HCPCS

## 2018-06-04 PROCEDURE — 27810376 ZZH PLUG CR25

## 2018-06-04 PROCEDURE — 25000128 H RX IP 250 OP 636: Performed by: PHYSICIAN ASSISTANT

## 2018-06-04 PROCEDURE — 75726 ARTERY X-RAYS ABDOMEN: CPT | Mod: XU

## 2018-06-04 PROCEDURE — 40000172 ZZH STATISTIC PROCEDURE PREP ONLY

## 2018-06-04 PROCEDURE — 37236 OPEN/PERQ PLACE STENT 1ST: CPT

## 2018-06-04 PROCEDURE — 80048 BASIC METABOLIC PNL TOTAL CA: CPT | Performed by: RADIOLOGY

## 2018-06-04 PROCEDURE — 85027 COMPLETE CBC AUTOMATED: CPT | Performed by: RADIOLOGY

## 2018-06-04 PROCEDURE — 99153 MOD SED SAME PHYS/QHP EA: CPT

## 2018-06-04 PROCEDURE — 27210905 ZZH KIT CR7

## 2018-06-04 PROCEDURE — 99152 MOD SED SAME PHYS/QHP 5/>YRS: CPT

## 2018-06-04 RX ORDER — PROCHLORPERAZINE MALEATE 5 MG
5 TABLET ORAL EVERY 6 HOURS PRN
Status: DISCONTINUED | OUTPATIENT
Start: 2018-06-04 | End: 2018-06-05 | Stop reason: HOSPADM

## 2018-06-04 RX ORDER — SODIUM CHLORIDE 9 MG/ML
INJECTION, SOLUTION INTRAVENOUS CONTINUOUS
Status: DISCONTINUED | OUTPATIENT
Start: 2018-06-04 | End: 2018-06-05 | Stop reason: HOSPADM

## 2018-06-04 RX ORDER — ONDANSETRON 2 MG/ML
4 INJECTION INTRAMUSCULAR; INTRAVENOUS EVERY 6 HOURS PRN
Status: DISCONTINUED | OUTPATIENT
Start: 2018-06-04 | End: 2018-06-05 | Stop reason: HOSPADM

## 2018-06-04 RX ORDER — FLUMAZENIL 0.1 MG/ML
0.2 INJECTION, SOLUTION INTRAVENOUS
Status: DISCONTINUED | OUTPATIENT
Start: 2018-06-04 | End: 2018-06-04 | Stop reason: HOSPADM

## 2018-06-04 RX ORDER — NALOXONE HYDROCHLORIDE 0.4 MG/ML
.1-.4 INJECTION, SOLUTION INTRAMUSCULAR; INTRAVENOUS; SUBCUTANEOUS
Status: DISCONTINUED | OUTPATIENT
Start: 2018-06-04 | End: 2018-06-04 | Stop reason: HOSPADM

## 2018-06-04 RX ORDER — PROCHLORPERAZINE 25 MG
25 SUPPOSITORY, RECTAL RECTAL EVERY 12 HOURS PRN
Status: DISCONTINUED | OUTPATIENT
Start: 2018-06-04 | End: 2018-06-05 | Stop reason: HOSPADM

## 2018-06-04 RX ORDER — LIDOCAINE 40 MG/G
CREAM TOPICAL
Status: DISCONTINUED | OUTPATIENT
Start: 2018-06-04 | End: 2018-06-04 | Stop reason: HOSPADM

## 2018-06-04 RX ORDER — CEFAZOLIN SODIUM 2 G/100ML
2 INJECTION, SOLUTION INTRAVENOUS
Status: COMPLETED | OUTPATIENT
Start: 2018-06-04 | End: 2018-06-04

## 2018-06-04 RX ORDER — IODIXANOL 320 MG/ML
250 INJECTION, SOLUTION INTRAVASCULAR ONCE
Status: COMPLETED | OUTPATIENT
Start: 2018-06-04 | End: 2018-06-04

## 2018-06-04 RX ORDER — SODIUM CHLORIDE 9 MG/ML
INJECTION, SOLUTION INTRAVENOUS CONTINUOUS
Status: DISCONTINUED | OUTPATIENT
Start: 2018-06-04 | End: 2018-06-04 | Stop reason: HOSPADM

## 2018-06-04 RX ORDER — NALOXONE HYDROCHLORIDE 0.4 MG/ML
.1-.4 INJECTION, SOLUTION INTRAMUSCULAR; INTRAVENOUS; SUBCUTANEOUS
Status: DISCONTINUED | OUTPATIENT
Start: 2018-06-04 | End: 2018-06-05 | Stop reason: HOSPADM

## 2018-06-04 RX ORDER — ACETAMINOPHEN 500 MG
500 TABLET ORAL EVERY 6 HOURS PRN
Status: DISCONTINUED | OUTPATIENT
Start: 2018-06-04 | End: 2018-06-05 | Stop reason: HOSPADM

## 2018-06-04 RX ORDER — FENTANYL CITRATE 50 UG/ML
25-50 INJECTION, SOLUTION INTRAMUSCULAR; INTRAVENOUS EVERY 5 MIN PRN
Status: DISCONTINUED | OUTPATIENT
Start: 2018-06-04 | End: 2018-06-04 | Stop reason: HOSPADM

## 2018-06-04 RX ORDER — NITROGLYCERIN 5 MG/ML
100-500 VIAL (ML) INTRAVENOUS
Status: COMPLETED | OUTPATIENT
Start: 2018-06-04 | End: 2018-06-04

## 2018-06-04 RX ADMIN — SODIUM CHLORIDE: 9 INJECTION, SOLUTION INTRAVENOUS at 17:47

## 2018-06-04 RX ADMIN — FENTANYL CITRATE 450 MCG: 50 INJECTION, SOLUTION INTRAMUSCULAR; INTRAVENOUS at 13:53

## 2018-06-04 RX ADMIN — ACETAMINOPHEN 500 MG: 500 TABLET, FILM COATED ORAL at 17:46

## 2018-06-04 RX ADMIN — ONDANSETRON 4 MG: 2 INJECTION INTRAMUSCULAR; INTRAVENOUS at 18:27

## 2018-06-04 RX ADMIN — MIDAZOLAM 1 MG: 1 INJECTION INTRAMUSCULAR; INTRAVENOUS at 16:15

## 2018-06-04 RX ADMIN — HEPARIN SODIUM 5000 UNITS: 1000 INJECTION, SOLUTION INTRAVENOUS; SUBCUTANEOUS at 13:05

## 2018-06-04 RX ADMIN — MIDAZOLAM 1 MG: 1 INJECTION INTRAMUSCULAR; INTRAVENOUS at 15:33

## 2018-06-04 RX ADMIN — LIDOCAINE HYDROCHLORIDE 10 ML: 10 INJECTION, SOLUTION EPIDURAL; INFILTRATION; INTRACAUDAL; PERINEURAL at 13:06

## 2018-06-04 RX ADMIN — NITROGLYCERIN 200 MCG: 5 INJECTION, SOLUTION INTRAVENOUS at 16:16

## 2018-06-04 RX ADMIN — HEPARIN SODIUM 5000 UNITS: 1000 INJECTION, SOLUTION INTRAVENOUS; SUBCUTANEOUS at 16:00

## 2018-06-04 RX ADMIN — MIDAZOLAM 9 MG: 1 INJECTION INTRAMUSCULAR; INTRAVENOUS at 13:52

## 2018-06-04 RX ADMIN — HYDROMORPHONE HYDROCHLORIDE: 10 INJECTION, SOLUTION INTRAMUSCULAR; INTRAVENOUS; SUBCUTANEOUS at 18:34

## 2018-06-04 RX ADMIN — IODIXANOL 180 ML: 320 INJECTION, SOLUTION INTRAVASCULAR at 17:02

## 2018-06-04 RX ADMIN — SODIUM CHLORIDE: 9 INJECTION, SOLUTION INTRAVENOUS at 11:38

## 2018-06-04 RX ADMIN — FENTANYL CITRATE 50 MCG: 50 INJECTION, SOLUTION INTRAMUSCULAR; INTRAVENOUS at 15:33

## 2018-06-04 RX ADMIN — PROCHLORPERAZINE EDISYLATE 10 MG: 5 INJECTION INTRAMUSCULAR; INTRAVENOUS at 20:39

## 2018-06-04 RX ADMIN — CEFAZOLIN SODIUM 2 G: 2 INJECTION, SOLUTION INTRAVENOUS at 11:38

## 2018-06-04 RX ADMIN — FENTANYL CITRATE 50 MCG: 50 INJECTION, SOLUTION INTRAMUSCULAR; INTRAVENOUS at 16:15

## 2018-06-04 ASSESSMENT — PAIN DESCRIPTION - DESCRIPTORS
DESCRIPTORS: ACHING
DESCRIPTORS: ACHING
DESCRIPTORS: ACHING;SHARP
DESCRIPTORS: SHARP
DESCRIPTORS: HEADACHE

## 2018-06-04 NOTE — PROGRESS NOTES
Interventional Radiology Pre-Procedure Sedation Assessment   Time of Assessment: 11:27 AM    Expected Level: Moderate Sedation    Indication: Sedation is required for the following type of Procedure: Arterial    Sedation and procedural consent: Risks, benefits and alternatives were discussed with Patient    PO Intake: Appropriately NPO for procedure    ASA Class: Class 1 - HEALTHY PATIENT    Mallampati: Grade 1:  Soft palate, uvula, tonsillar pillars, and posterior pharyngeal wall visible    Lungs: Lungs Clear with good breath sounds bilaterally    Heart: Normal heart sounds and rate    History and physical reviewed and no updates needed. I have reviewed the lab findings, diagnostic data, medications, and the plan for sedation. I have determined this patient to be an appropriate candidate for the planned sedation and procedure and have reassessed the patient IMMEDIATELY PRIOR to sedation and procedure.    Ebenezer Boyd MD

## 2018-06-04 NOTE — IP AVS SNAPSHOT
MRN:[id number]                      After Visit Summary   6/4/2018    Octavia Treviño    MRN: [id number]           Thank you!     Thank you for choosing [facility] for your care. Our goal is always to provide you with excellent care. Hearing back from our patients is one way we can continue to improve our services. Please take a few minutes to complete the written survey that you may receive in the mail after you visit with us. Thank you!        Patient Information     Date Of Birth          1990        Designated Caregiver       Most Recent Value    Caregiver    Will someone help with your care after discharge? no      About your hospital stay     You were admitted on:  June 4, 2018 You last received care in the:  [address] [facility]    You were discharged on:  June 5, 2018        Reason for your hospital stay       You were admitted for a splenic artery embolization that was successfully done.                  Who to Call     For medical emergencies, please call 911.  For non-urgent questions about your medical care, please call your primary care provider or clinic, [phone]          Attending Provider     Provider Specialty    Beto Easley MD Radiology    Ailyn Velasquez DO Internal Medicine       Primary Care Provider Office Phone # Fax #    [facility], [phone]      After Care Instructions     Activity       Your activity upon discharge:No lifting over 5 lobs for 3-4 days, then gradually increase your activity level as tolerated            Diet       Follow this diet upon discharge: Regular diet                  Follow-up Appointments     Follow Up and recommended labs and tests       1. With Dr. Easley in Inter[facility] Radiology Clinic in 1 month. Please call their office to schedule this appointment                  Pending Results     Date and Time Order Name Status Description    6/4/2018 1038 IR Visceral Embolization In process            "  Statement of Approval     Ordered          18 1159  I have reviewed and agree with all the recommendations and orders detailed in this document.  EFFECTIVE NOW     Approved and electronically signed by:  Eddie Hassan MD             Admission Information     Date & Time Provider Department Dept. Phone    2018 Ailyn Velasquez DO [address] [facility]MT [facility] [phone]      Your Vitals Were     Blood Pressure Pulse Temperature Respirations Height Weight    101/66 77 97.8  F (36.6  C) (Axillary) 18 1.609 m (5' 3.35\") 77.1 kg (170 lb)    Pulse Oximetry BMI (Body Mass Index)                97% 29.78 kg/m2          MyChart Information     [employer] lets you send messages to your doctor, view your test results, renew your prescriptions, schedule appointments and more. To sign up, go to [email or web address] . Click on \"Log in\" on the left side of the screen, which will take you to the Welcome page. Then click on \"Sign up Now\" on the right side of the page.     You will be asked to enter the access code listed below, as well as some personal information. Please follow the directions to create your username and password.     Your access code is: [email or web address]  Expires: 2018  6:30 AM     Your access code will  in 90 days. If you need help or a new code, please call your [facility] clinic or [phone].        Care EveryWhere ID     This is your Care EveryWhere ID. This could be used by other organizations to access your [facility] medical records  [id number]        Equal Access to Services     [facility]: Hadii irma rojas Sokristen, waaxda luqadaha, qaybta kaalmastar brambila . So [facility] [phone].    ATENCIÓN: Si habla español, tiene a fajardo disposición servicios gratuitos de asistencia lingüística. Llame al [phone].    We comply with applicable federal civil rights laws and [place] laws. We do not discriminate on the basis of race, color, " national origin, age, disability, sex, sexual orientation, or gender identity.               Review of your medicines      START taking        Dose / Directions    ondansetron 4 MG ODT tab   Commonly known as:  ZOFRAN ODT        Dose:  4-8 mg   Take 1-2 tablets (4-8 mg) by mouth every 8 hours as needed for nausea or vomiting   Quantity:  10 tablet   Refills:  0       oxyCODONE IR 5 MG tablet   Commonly known as:  ROXICODONE        Dose:  5 mg   Take 1 tablet (5 mg) by mouth every 6 hours as needed for severe pain   Quantity:  6 tablet   Refills:  0         CONTINUE these medicines which have NOT CHANGED        Dose / Directions    IBUPROFEN PO        Dose:  800 mg   Take 800 mg by mouth every 6 hours as needed for moderate pain   Refills:  0       levothyroxine 137 MCG tablet   Commonly known as:  SYNTHROID/LEVOTHROID        Dose:  137 mcg   Take 137 mcg by mouth daily   Refills:  0            Where to get your medicines      Some of these will need a paper prescription and others can be bought over the counter. Ask your nurse if you have questions.     Bring a paper prescription for each of these medications     ondansetron 4 MG ODT tab    oxyCODONE IR 5 MG tablet                Protect others around you: Learn how to safely use, store and throw away your medicines at www.disposemymeds.org.        Information about OPIOIDS     PRESCRIPTION OPIOIDS: WHAT YOU NEED TO KNOW   You have a prescription for an opioid (narcotic) pain medicine. Opioids can cause addiction. If you have a history of chemical dependency of any type, you are at a higher risk of becoming addicted to opioids. Only take this medicine after all other options have been tried. Take it for as short a time and as few doses as possible.     Do not:    Drive. If you drive while taking these medicines, you could be arrested for driving under the influence (DUI).    Operate heavy machinery    Do any other dangerous activities while taking these medicines.      Drink any alcohol while taking these medicines.      Take with any other medicines that contain acetaminophen. Read all labels carefully. Look for the word  acetaminophen  or  Tylenol.  Ask your pharmacist if you have questions or are unsure.    Store your pills in a secure place, locked if possible. We will not replace any lost or stolen medicine. If you don t finish your medicine, please throw away (dispose) as directed by your pharmacist. The [place] Pollution Control Agency has more information about safe disposal: https://www.pca.[facility].[place].us/living-green/managing-unwanted-medications    All opioids tend to cause constipation. Drink plenty of water and eat foods that have a lot of fiber, such as fruits, vegetables, prune juice, apple juice and high-fiber cereal. Take a laxative (Miralax, milk of magnesia, Colace, Senna) if you don t move your bowels at least every other day.              Medication List: This is a list of all your medications and when to take them. Check marks below indicate your daily home schedule. Keep this list as a reference.      Medications           Morning Afternoon Evening Bedtime As Needed    IBUPROFEN PO   Take 800 mg by mouth every 6 hours as needed for moderate pain                                levothyroxine 137 MCG tablet   Commonly known as:  SYNTHROID/LEVOTHROID   Take 137 mcg by mouth daily                                ondansetron 4 MG ODT tab   Commonly known as:  ZOFRAN ODT   Take 1-2 tablets (4-8 mg) by mouth every 8 hours as needed for nausea or vomiting                                oxyCODONE IR 5 MG tablet   Commonly known as:  ROXICODONE   Take 1 tablet (5 mg) by mouth every 6 hours as needed for severe pain

## 2018-06-04 NOTE — IP AVS SNAPSHOT
[address] [facility] [address] [address]    Phone:  [phone]    Fax:  [phone]                                       After Visit Summary   6/4/2018    Octavia Treviño    MRN: [id number]           After Visit Summary Signature Page     I have received my discharge instructions, and my questions have been answered. I have discussed any challenges I see with this plan with the nurse or doctor.    ..........................................................................................................................................  Patient/Patient Representative Signature      ..........................................................................................................................................  Patient Representative Print Name and Relationship to Patient    ..................................................               ................................................  Date                                            Time    ..........................................................................................................................................  Reviewed by Signature/Title    ...................................................              ..............................................  Date                                                            Time

## 2018-06-04 NOTE — PROGRESS NOTES
Prep and teaching complete for Splenic Artery Embolization; , [place], phone:  [phone]. Sister Seema. Both at bedside, will wait in [facility]. Planned overnight admission to .  Pt awake and alert, denies pain. Dr Boyd here for consent. H and P is current; awaiting lab results. Contrast teaching done pre and post procedure.

## 2018-06-04 NOTE — PROGRESS NOTES
Patient Name: Octavia Treviño  Medical Record Number: [id number]  Today's Date: 6/4/2018    Procedure: splenic artery aneurysm embolization   Proceduralist: Dr.Talaie Licona     Sedation start time: 1240  Sedation end time: 1648  Sedation medications administered: 11 mg versed 550 mcg fentanyl       Procedure start time: 1300  Puncture time: 1307  Procedure end time: 1648  Patient out of room: 1715    Report given to: RN     Pt arrived to IR room 4 from 2A. Pt denies any questions or concerns regarding procedure. Pt positioned supine and monitored per protocol. Pt tolerated procedure without any noted complications.  Mynx closure device used at 1634 on right groin interventional access, Hemastasis achieved, site covered with tegaderm.  No oozing, bleeding, or hematoma noted to site. Pt educated on care site and importance to lay flat until further instructed.  Pt transferred to 6D via cart.  Report given and site checked with receiving RN.  No bleeding, oozing, or hematoma noted to right groin site.  Pt denies any symptoms.Pt transferred to 6D at 1915.    Patient must lay flat until 1934 with right leg straight.    Shlomo RN,BSN [phone]

## 2018-06-04 NOTE — PROCEDURES
Interventional Radiology Brief Post Procedure Note    Procedure: IR VISCERAL EMBOLIZATION    Proceduralist: Beto Easley MD, Jairo Shearer MD     Assistant: Ebenezer Boyd MD    Time Out: Prior to the start of the procedure and with procedural staff participation, I verbally confirmed the patient s identity using two indicators, relevant allergies, that the procedure was appropriate and matched the consent or emergent situation, and that the correct equipment/implants were available. Immediately prior to starting the procedure I conducted the Time Out with the procedural staff and re-confirmed the patient s name, procedure, and site/side. (The Joint Commission universal protocol was followed.)  Yes    Medications   Medication Event Details Admin User Admin Time   heparin 5,000 units in 0.9% sodium chloride 1000 mL Medication New Bag by Other Clinician Dose: 5,000 Units; Route: TABLE Ya Rea RN 6/4/2018  1:05 PM   lidocaine 1 % 1-30 mL Medication Given by Other Dose: 10 mL; Route: Intradermal Ya Gutierrez RN 6/4/2018  1:06 PM   midazolam (VERSED) injection 0.5-1 mg Medication Given Dose: 9 mg; Route: Intravenous; Comment: see VS flowsheets for exact time Ya Gutierrez RN 6/4/2018  1:52 PM   fentaNYL (PF) (SUBLIMAZE) injection 25-50 mcg Medication Given Dose: 450 mcg; Route: Intravenous Ya Gutierrez RN 6/4/2018  1:53 PM   midazolam (VERSED) injection 0.5-1 mg Medication Given Dose: 1 mg; Route: Intravenous Mahendra, Aditya, RN 6/4/2018  3:33 PM   fentaNYL (PF) (SUBLIMAZE) injection 25-50 mcg Medication Given Dose: 50 mcg; Route: Intravenous Mahendra, Aditya, RN 6/4/2018  3:33 PM   heparin 5,000 units in 0.9% sodium chloride 1000 mL Medication New Bag Dose: 5,000 Units; Route: TABLE Aditya Goncalves RN 6/4/2018  4:00 PM   midazolam (VERSED) injection 0.5-1 mg Medication Given Dose: 1 mg; Route: Intravenous Mahendra, Aditya, RN 6/4/2018  4:15 PM   fentaNYL (PF) (SUBLIMAZE) injection 25-50 mcg  Medication Given Dose: 50 mcg; Route: Intravenous Mahendra, Aditya, RN 6/4/2018  4:15 PM   nitroGLYcerin injection 100-500 mcg Medication Given by Other Dose: 200 mcg; Route: INTRA-ARTERIAL; Comment: given by Aditya Randhawa RN 6/4/2018  4:16 PM       Sedation: IR Nurse Monitored Care   Post Procedure Summary:  Prior to the start of the procedure and with procedural staff participation, I verbally confirmed the patient s identity using two indicators, relevant allergies, that the procedure was appropriate and matched the consent or emergent situation, and that the correct equipment/implants were available. Immediately prior to starting the procedure I conducted the Time Out with the procedural staff and re-confirmed the patient s name, procedure, and site/side. (The Joint Commission universal protocol was followed.)  Yes       Sedatives: Fentanyl and Midazolam (Versed) and Fentanyl    Vital signs, airway and pulse oximetry were monitored and remained stable throughout the procedure and sedation was maintained until the procedure was complete.  The patient was monitored by staff until sedation discharge criteria were met.    Patient tolerance: Patient tolerated the procedure well with no immediate complications.    Time of sedation in minutes: 180 Minutes minutes from beginning to end of physician one to one monitoring.          Findings: splenic artery aneurysm , a branch successfully embolized with MVP 5 and then the lower branch was stent grafted with complete resolution of the aneurysm     Estimated Blood Loss: Less than 10 ml    Fluoroscopy Time:  minute(s)    SPECIMENS: None    Complications: 1. None     Condition: Stable    Plan:   -Bedrest for 3 hrs with right leg straight  -PCA dialudid for pain control overnight   -admit for observation overnight  - Dr. Easley clinic follow up in 1 month    Comments: See dictated procedure note for full details.    Ebenezer Boyd MD

## 2018-06-05 ENCOUNTER — TELEPHONE (OUTPATIENT)
Dept: INTERVENTIONAL RADIOLOGY/VASCULAR | Facility: CLINIC | Age: 28
End: 2018-06-05

## 2018-06-05 VITALS
HEIGHT: 63 IN | DIASTOLIC BLOOD PRESSURE: 65 MMHG | OXYGEN SATURATION: 98 % | BODY MASS INDEX: 30.12 KG/M2 | SYSTOLIC BLOOD PRESSURE: 108 MMHG | HEART RATE: 77 BPM | RESPIRATION RATE: 18 BRPM | TEMPERATURE: 98.6 F | WEIGHT: 170 LBS

## 2018-06-05 DIAGNOSIS — I72.8 SPLENIC ARTERY ANEURYSM (H): Primary | ICD-10-CM

## 2018-06-05 LAB
ANION GAP SERPL CALCULATED.3IONS-SCNC: 7 MMOL/L (ref 3–14)
BUN SERPL-MCNC: 10 MG/DL (ref 7–30)
CALCIUM SERPL-MCNC: 8.1 MG/DL (ref 8.5–10.1)
CHLORIDE SERPL-SCNC: 107 MMOL/L (ref 94–109)
CO2 SERPL-SCNC: 25 MMOL/L (ref 20–32)
CREAT SERPL-MCNC: 0.58 MG/DL (ref 0.52–1.04)
GFR SERPL CREATININE-BSD FRML MDRD: >90 ML/MIN/1.7M2
GLUCOSE SERPL-MCNC: 92 MG/DL (ref 70–99)
POTASSIUM SERPL-SCNC: 3.6 MMOL/L (ref 3.4–5.3)
SODIUM SERPL-SCNC: 138 MMOL/L (ref 133–144)

## 2018-06-05 PROCEDURE — 25000132 ZZH RX MED GY IP 250 OP 250 PS 637: Performed by: RADIOLOGY

## 2018-06-05 PROCEDURE — 25000132 ZZH RX MED GY IP 250 OP 250 PS 637: Performed by: PHYSICIAN ASSISTANT

## 2018-06-05 PROCEDURE — G0378 HOSPITAL OBSERVATION PER HR: HCPCS

## 2018-06-05 PROCEDURE — 36415 COLL VENOUS BLD VENIPUNCTURE: CPT | Performed by: INTERNAL MEDICINE

## 2018-06-05 PROCEDURE — 80048 BASIC METABOLIC PNL TOTAL CA: CPT | Performed by: INTERNAL MEDICINE

## 2018-06-05 PROCEDURE — 99217 ZZC OBSERVATION CARE DISCHARGE: CPT | Performed by: HOSPITALIST

## 2018-06-05 RX ORDER — ONDANSETRON 4 MG/1
4-8 TABLET, ORALLY DISINTEGRATING ORAL EVERY 8 HOURS PRN
Qty: 10 TABLET | Refills: 0 | Status: SHIPPED | OUTPATIENT
Start: 2018-06-05

## 2018-06-05 RX ORDER — OXYCODONE HYDROCHLORIDE 5 MG/1
5 TABLET ORAL EVERY 6 HOURS PRN
Qty: 6 TABLET | Refills: 0 | Status: SHIPPED | OUTPATIENT
Start: 2018-06-05

## 2018-06-05 RX ADMIN — ACETAMINOPHEN 500 MG: 500 TABLET, FILM COATED ORAL at 07:38

## 2018-06-05 RX ADMIN — PROCHLORPERAZINE MALEATE 5 MG: 5 TABLET, FILM COATED ORAL at 07:38

## 2018-06-05 ASSESSMENT — ACTIVITIES OF DAILY LIVING (ADL)
DRESS: 0-->INDEPENDENT
RETIRED_COMMUNICATION: 0-->UNDERSTANDS/COMMUNICATES WITHOUT DIFFICULTY
SWALLOWING: 0-->SWALLOWS FOODS/LIQUIDS WITHOUT DIFFICULTY
COGNITION: 0 - NO COGNITION ISSUES REPORTED
AMBULATION: 0-->INDEPENDENT
TRANSFERRING: 0-->INDEPENDENT
FALL_HISTORY_WITHIN_LAST_SIX_MONTHS: NO
RETIRED_EATING: 0-->INDEPENDENT
BATHING: 0-->INDEPENDENT
TOILETING: 0-->INDEPENDENT

## 2018-06-05 NOTE — PLAN OF CARE
Problem: Patient Care Overview  Goal: Plan of Care/Patient Progress Review  Outcome: No Change  PRIMARY DIAGNOSIS: splenic artery aneursym  OUTPATIENT/OBSERVATION GOALS TO BE MET BEFORE DISCHARGE:  1. Stable vital signs Yes  2. Tolerating diet:Yes  3. Pain controlled with oral pain medications:  No  4. Positive bowel sounds:  Yes  5. Voiding without difficulty:  Yes  6. Able to ambulate:  No  7. Provider specific discharge goals met:  No    Discharge Planner Nurse   Safe discharge environment identified: Yes  Barriers to discharge: No       Entered by: Shala Marte 06/04/2018 10:59 PM        VSS: yes  Labs stable: thus far, yes  Tolerating PO intake and medications: yes; has eaten small amounts, now trying again post antiemetic    Please review provider order for any additional goals.   Nurse to notify provider when observation goals have been met and patient is ready for discharge.    Pt admitted to unit at 1730. Pt was nauseous & 4 mg zofran given with no relief. Emesis x2. 10 mg compazine ordered and given with relief. Pt reported headache and back/abdomen pain. Pt's incision site WNL; no bruising/bleeding. R dorsalis pedis pulse normal, no discoloration & sensation intact. Dilaudid PCA started; 0.2 mg q 10 min, 1 hour max 1.8 mg. Pt reports that pain and nausea are currently under control. Pt to d/c tomorrow if stable throughout night and goals are met.

## 2018-06-05 NOTE — H&P
[facility] History and Physical  Department of Internal Medicine    Patient Name: Octavia Treviño MRN# [id number]   Age: 27 year old YOB: 1990     Date of Admission:6/4/2018    Primary care provider: [facility], [place]  Date of Service: 6/4/2018  Admitting Team: [facility]         Assessment and Plan:   Octavia Terviño is a 27 year old female with a history of splenic aneurysm found incidentally admitted for pain control after splenic artery branch embolization.    Acute post operative pain post splenic aneurysm artery branch  - dialudid PCA tonight   - antiemetics  - BMP in a.m.  - IV fluids    CODE: Full code  Diet/IVF: fluids  DVT ppx: not indicated  Disposition/Admission Status: home in a.m.    Ailyn Velasquez  Internal Medicine Staff    [facility]           Chief Complaint:   pain         HPI:   27 year old female with a history of splenic aneurysm found incidentally admitted for pain control after splenic artery branch embolization.  Procedure went well.  She is having post operative pain and vomiting.           Past Medical History:     Past Medical History:   Diagnosis Date     Hypothyroidism        PMH reviewed and up to date         Past Surgical History:     Past Surgical History:   Procedure Laterality Date     NO HISTORY OF SURGERY         PS reviewed and up to date         Social History:     Social History     Social History     Marital status:      Spouse name: N/A     Number of children: 2     Years of education: N/A     Occupational History           Social History Main Topics     Smoking status: Never Smoker     Smokeless tobacco: Never Used     Alcohol use No     Drug use: No     Sexual activity: Yes     Partners: Male     Birth control/ protection: Condom     Other Topics Concern     Not on file     Social History Narrative            Family History:     Family History   Problem Relation Age of Onset     Pacemaker Mother       "Unknown/Adopted Father      Lupus Maternal Grandmother              Allergies:    No Known Allergies         Medications:     Prior to Admission medications    Medication Sig Last Dose Taking? Auth Provider   IBUPROFEN PO Take 800 mg by mouth every 6 hours as needed for moderate pain Past Week at Unknown time Yes Reported, Patient   levothyroxine (SYNTHROID/LEVOTHROID) 137 MCG tablet Take 137 mcg by mouth daily 6/3/2018 at 0800 Yes Reported, Patient             Review of Systems:     A complete ROS was performed and is negative other than what is stated in the HPI.         Physical Exam:   Blood pressure 96/67, pulse 77, temperature 97.8  F (36.6  C), temperature source Axillary, resp. rate 16, height 1.609 m (5' 3.35\"), weight 77.1 kg (170 lb), SpO2 99 %, not currently breastfeeding.    Physical Exam   Constitutional:   Well nourished, well developed, resting comfortably   Head: Normocephalic and atraumatic.   Cardiovascular: Regular rate and rhythm without murmurs or gallops  Pulmonary/Chest: Clear to auscultation bilaterally   GI: Soft with good bowel sounds.  Non-tender, non-distended, with no guarding, no rebound, no peritoneal signs.   Neurological: Alert and oriented to person, place, and time. Nonfocal exam            Data:   reviewed      Ailyn Velasquez DO          "

## 2018-06-05 NOTE — PROGRESS NOTES
[facility], [place]   Interventional Radiology      Date of Visit:  June 5, 2018         Impression and Recommendations:   Impression:  Patient with no significant past medial history who was found to have an incidental 2.3 cm distals splenic artery aneurysm, who underwent embolization of the splenic artery aneurysm successfully on 6/4/2018. She did excellent overnight her pain was well controlled with one dose of 0.2 Dilaudid. She did have nausea and vomited once last night but that was controlled with antiemetics. No LUQ abdominal pain no right groin pain. No hematoma or oozing at the right groin.     Recommendations:  - Pt may be discharged if meets criteria per primary team.   - CT (multiphasic CT with non contrast and then angiographic and alisa-venous phase) in 1 month with follow up clinic visit Dr. Easley clinic             Physical Exam:   Ranges for vital signs:  Temp:  [97.5  F (36.4  C)-98.6  F (37  C)] 97.8  F (36.6  C)  Pulse:  [63-84] 77  Heart Rate:  [71-80] 71  Resp:  [12-18] 18  BP: ()/(58-83) 101/66  SpO2:  [90 %-100 %] 97 %    Intake/Output Summary (Last 24 hours) at 06/05/18 1101  Last data filed at 06/05/18 1000   Gross per 24 hour   Intake             2600 ml   Output             1250 ml   Net             1350 ml     Exam:  GENERAL: sitting in bed in no acute distress.   NECK:  Supple.  LUNGS:  Breathing in no distress  ABDOMEN:  Soft non tender   EXT: Extremities warm and without edema. Right groin dressing c/d/i, no hematoma     Ebenezer Boyd MD, MPH  Vascular and Interventional Radiology Fellow  Pager: [phone]

## 2018-06-05 NOTE — PLAN OF CARE
Problem: Patient Care Overview  Goal: Plan of Care/Patient Progress Review  PRIMARY DIAGNOSIS: Splenic artery aneurysm  OUTPATIENT/OBSERVATION GOALS TO BE MET BEFORE DISCHARGE:  1. Stable vital signs Yes  2. Tolerating diet:Yes  3. Pain controlled with oral pain medications:  No  4. Positive bowel sounds:  Yes  5. Voiding without difficulty:  Yes  6. Able to ambulate:  No  7. Provider specific discharge goals met:  No    Discharge Planner Nurse   Safe discharge environment identified: Yes  Barriers to discharge: No       Entered by: Shala Marte 06/04/2018 7:01 PM     Please review provider order for any additional goals.   Nurse to notify provider when observation goals have been met and patient is ready for discharge.

## 2018-06-05 NOTE — PLAN OF CARE
Problem: Patient Care Overview  Goal: Plan of Care/Patient Progress Review  PRIMARY DIAGNOSIS: Splenic artery aneurysm  OUTPATIENT/OBSERVATION GOALS TO BE MET BEFORE DISCHARGE:  1. Stable vital signs: Yes  2. Tolerating diet: Yes  3. Pain controlled with oral pain medications:  No  4. Positive bowel sounds:  Yes  5. Voiding without difficulty:  Yes  6. Able to ambulate:  Yes  7. Provider specific discharge goals met:  Yes     Discharge Planner Nurse   Safe discharge environment identified: Yes  Barriers to discharge: No       Entered by: Alba Motta 06/05/2018 3:51 AM  Please review provider order for any additional goals.   Nurse to notify provider when observation goals have been met and patient is ready for discharge.           06/05/18 0348   OTHER   Plan Of Care Reviewed With patient   Plan of Care Review   Progress improving

## 2018-06-05 NOTE — PROGRESS NOTES
Pt dismissed to home in stable condition with friend.  Pt given AVS and her questions were answered.  VSS.

## 2018-06-05 NOTE — PLAN OF CARE
Problem: Patient Care Overview  Goal: Plan of Care/Patient Progress Review  Outcome: Adequate for Discharge Date Met: 06/05/18 06/05/18 1302   OTHER   Plan Of Care Reviewed With patient   Plan of Care Review   Progress improving     Pt denies pain/nausea, she ate a full breakfast.

## 2018-06-05 NOTE — PLAN OF CARE
Problem: Patient Care Overview  Goal: Plan of Care/Patient Progress Review  Outcome: Improving  Problem: Patient Care Overview  Goal: Plan of Care/Patient Progress Review  PRIMARY DIAGNOSIS: Splenic artery aneurysm  OUTPATIENT/OBSERVATION GOALS TO BE MET BEFORE DISCHARGE:  1. Stable vital signs: Yes  2. Tolerating diet: Yes  3. Pain controlled with oral pain medications:  No  4. Positive bowel sounds:  Yes  5. Voiding without difficulty:  Yes  6. Able to ambulate:  Yes  7. Provider specific discharge goals met:  Yes      Discharge Planner Nurse   Safe discharge environment identified: Yes  Barriers to discharge: No       Entered by: Alba Motta 06/05/2018 6:29 AM     Please review provider order for any additional goals.   Nurse to notify provider when observation goals have been met and patient is ready for discharge.    Soft BP's 90's-100's/50's-60's. Receiving continuous 0.9% NS at 100mL/hr. OVSS. A&Ox4. Patient very sleep overnight. Got up to bathroom x1. Steady on feet. Calls appropriately. Has dilaudid PCA. No continuous rate, 0.2mg available q10 minutes. Pt used pump x1 for slight headache. Pt encouraged to try oral medication this AM. No reports of nausea or emesis. No SOB. Surgical site looks clean, dry and intact with no drainage. Friend, Vivian, present in room, pleasant and supportive. Pt has no other complaints, continue to monitor.        06/05/18 0629   OTHER   Plan Of Care Reviewed With patient   Plan of Care Review   Progress improving

## 2018-06-05 NOTE — LETTER
June 7, 2018    Octavia Treviño  [address] [place] [address]    Dear Octavia,     Appointment Reminder:      Your CT has been scheduled for Tuesday, July 3rd @ 11:20 am.     Please arrive 15 minutes early.     Allow 20 minutes for this exam.    Nothing to eat or drink 2 hours prior    To check in for this test go to the [facility] building located at [address] [place] [address]. Imaging will be completed on the first floor.    Your clinic appointment with Dr. Easley will be on Tuesday, July 3rd at 12 pm, at the [facility] building, 1st floor.    Please feel free to call me with any questions or concerns as this date approaches.    Sincerely,    Nikki Aparicio RN, BSN  Interventional Radiology Care Coordinator  Office: [phone]

## 2018-06-05 NOTE — DISCHARGE SUMMARY
Inpatient Discharge Summary    Date of Admission: 6/4/2018  Date of Discharge: 6/5/2018    Discharge Diagnosis:   1. Splenic Artery Embolization    Procedures During Hospitalization:   1. Splenic artery embolization performed on 6/4    Consulting Services:   1. Interventional Radiology    History of Present Illness: Octavia Treviño is a 27 year old female with a history of splenic aneurysm found incidentally admitted for pain control after splenic artery branch embolization.    Hospital Course:   Octavia was admitted to the hospital for pain control following her IR embolization of the splenic artery. Her pain overnight was well controlled and she tolerated a regular breakfast in the morning and so was felt ok to be discharged home.    Discharge Physical Exam:   B/P: 108/65, T: 98.6, P: 77, R: 18  GEN: Pleasant, in NAD  CHEST: CTA B  CV: RRR  ABD: soft, nt/nd  EXT: no edema. R groin site without hematoma    Discharge Medications:      Review of your medicines      START taking       Dose / Directions    ondansetron 4 MG ODT tab   Commonly known as:  ZOFRAN ODT   Used for:  Splenic artery aneurysm (H)        Dose:  4-8 mg   Take 1-2 tablets (4-8 mg) by mouth every 8 hours as needed for nausea or vomiting   Quantity:  10 tablet   Refills:  0       oxyCODONE IR 5 MG tablet   Commonly known as:  ROXICODONE   Used for:  Splenic artery aneurysm (H)        Dose:  5 mg   Take 1 tablet (5 mg) by mouth every 6 hours as needed for severe pain   Quantity:  6 tablet   Refills:  0         CONTINUE these medicines which have NOT CHANGED       Dose / Directions    IBUPROFEN PO        Dose:  800 mg   Take 800 mg by mouth every 6 hours as needed for moderate pain   Refills:  0       levothyroxine 137 MCG tablet   Commonly known as:  SYNTHROID/LEVOTHROID        Dose:  137 mcg   Take 137 mcg by mouth daily   Refills:  0            Where to get your medicines      Some of these will need a paper prescription and others can be bought  over the counter. Ask your nurse if you have questions.     Bring a paper prescription for each of these medications      ondansetron 4 MG ODT tab     oxyCODONE IR 5 MG tablet             Discharge Follow-up:   1. In one month with Dr. Easley in IR clinic with a multiphasic CT with non contrast and then angiographic and alisa-venous phase of the abd/pelvis    Dr. Eddie Hassan MD MPH  Internal Medicine and Pediatric Hospitalist  [phone]

## 2018-06-07 NOTE — TELEPHONE ENCOUNTER
I called and spoke with Octavia, she is doing ok after her splenic artery embolization procedure with Dr. Easley.  She denies nausea, is eating and drinking well. She denies drainage or lump at access site.  She is encourage to remove the dressing and assess with her finger for growing lump.  She does have some tenderness with walking at access site.  Pt also having some tolerable left upper flank pain she isn't currently taking any pain medication.  She She will continue to monitor and call me if she continues to have pain or worsening pain.  She has this pain with deep breathing or coughing.  Pt scheduled for 1 month f/up and a letter was sent with appt details.  LINUS Aparicio RN, BSN  Interventional Radiology Care Coordinator   Phone:  [phone]

## 2018-06-28 ENCOUNTER — TELEPHONE (OUTPATIENT)
Dept: TRANSPLANT | Facility: CLINIC | Age: 28
End: 2018-06-28

## 2018-06-28 NOTE — TELEPHONE ENCOUNTER
"Octavia called to see if there was an update with her tissue typing results.  I reviewed those results with her and there was no DSA with her recipient.  Octavia was happy to hear that information.  Octavia reports she has been doing well s/p splenic artery embolization 6/4/18.  She reports some minor transient discomfort where the stent is but is otherwise \"back to normal\".  She has a f/u IR appt on 7/3/18 and I will await the note from that appointment.  I told Octavia I could re-present her to committee on 7/11/18 and will follow-up with her after that date.  Octavia still needs to complete a pap smear in order to be complaint with routine CA screening.  She is aware and will schedule when it is convenient for her.    Octavia knows how to get a hold of me in the meantime if she has any additional questions/concerns.    Niki Zafar RN, BSN, CCTN  Living Donor Coordinator  [phone]        "

## 2018-07-02 ENCOUNTER — TELEPHONE (OUTPATIENT)
Dept: INTERVENTIONAL RADIOLOGY/VASCULAR | Facility: CLINIC | Age: 28
End: 2018-07-02

## 2018-07-02 NOTE — TELEPHONE ENCOUNTER
Octavia called, she has a spot on her leg she is concerned about.  She has sent the following photo, pt scheduled for clinic visit with Dr. Easley 7/3/18.     LINUS Aparicio RN, BSN  Interventional Radiology Care Coordinator   Phone:  [phone]

## 2018-07-03 ENCOUNTER — RADIANT APPOINTMENT (OUTPATIENT)
Dept: CT IMAGING | Facility: CLINIC | Age: 28
End: 2018-07-03
Payer: COMMERCIAL

## 2018-07-03 ENCOUNTER — OFFICE VISIT (OUTPATIENT)
Dept: INTERVENTIONAL RADIOLOGY/VASCULAR | Facility: CLINIC | Age: 28
End: 2018-07-03
Payer: COMMERCIAL

## 2018-07-03 ENCOUNTER — APPOINTMENT (OUTPATIENT)
Dept: LAB | Facility: CLINIC | Age: 28
End: 2018-07-03
Payer: COMMERCIAL

## 2018-07-03 VITALS — HEART RATE: 64 BPM | OXYGEN SATURATION: 100 % | DIASTOLIC BLOOD PRESSURE: 83 MMHG | SYSTOLIC BLOOD PRESSURE: 125 MMHG

## 2018-07-03 DIAGNOSIS — I72.8 SPLENIC ARTERY ANEURYSM (H): ICD-10-CM

## 2018-07-03 DIAGNOSIS — I72.8 SPLENIC ARTERY ANEURYSM (H): Primary | ICD-10-CM

## 2018-07-03 LAB — HCG SERPL QL: NEGATIVE

## 2018-07-03 RX ORDER — IOPAMIDOL 755 MG/ML
100 INJECTION, SOLUTION INTRAVASCULAR ONCE
Status: COMPLETED | OUTPATIENT
Start: 2018-07-03 | End: 2018-07-03

## 2018-07-03 RX ADMIN — IOPAMIDOL 100 ML: 755 INJECTION, SOLUTION INTRAVASCULAR at 12:05

## 2018-07-03 NOTE — MR AVS SNAPSHOT
After Visit Summary   7/3/2018    Octavia Treviño    MRN: [id number]           Patient Information     Date Of Birth          1990        Visit Information        Provider Department      7/3/2018 12:00 PM Beto Easley MD [facility] Interventional Radiology        Today's Diagnoses     Splenic artery aneurysm (H)    -  1       Follow-ups after your visit        Who to contact     Please call your clinic at [phone] to:    Ask questions about your health    Make or cancel appointments    Discuss your medicines    Learn about your test results    Speak to your doctor            Additional Information About Your Visit        Care EveryWhere ID     This is your Care EveryWhere ID. This could be used by other organizations to access your [facility] medical records  [id number]        Your Vitals Were     Pulse Pulse Oximetry                64 100%           Blood Pressure from Last 3 Encounters:   07/03/18 125/83   06/05/18 108/65   05/29/18 115/76    Weight from Last 3 Encounters:   06/04/18 77.1 kg (170 lb)   05/29/18 77.1 kg (170 lb)   05/24/18 77.1 kg (170 lb)               Primary Care Provider Office Phone # Fax #    [facility], [phone]       [address] [place], [place] [address]        Equal Access to Services     RODRIGO GOMEZ AH: Hadii aad ku hadasho Soomaali, waaxda luqadaha, qaybta kaalmada adeegyada, waxay idiin hayjuanan colleen barajas laalberto . So [facility] [phone].    ATENCIÓN: Si habla español, tiene a fajardo disposición servicios gratuitos de asistencia lingüística. alvaro al [phone].    We comply with applicable federal civil rights laws and [place] laws. We do not discriminate on the basis of race, color, national origin, age, disability, sex, sexual orientation, or gender identity.            Thank you!     Thank you for choosing [facility] INTERVENTIONAL RADIOLOGY  for your care. Our goal is always to provide you with excellent care. Hearing back  from our patients is one way we can continue to improve our services. Please take a few minutes to complete the written survey that you may receive in the mail after your visit with us. Thank you!             Your Updated Medication List - Protect others around you: Learn how to safely use, store and throw away your medicines at www.disposemymeds.org.          This list is accurate as of 7/3/18 11:59 PM.  Always use your most recent med list.                   Brand Name Dispense Instructions for use Diagnosis    IBUPROFEN PO      Take 800 mg by mouth every 6 hours as needed for moderate pain        levothyroxine 137 MCG tablet    SYNTHROID/LEVOTHROID     Take 137 mcg by mouth daily        ondansetron 4 MG ODT tab    ZOFRAN ODT    10 tablet    Take 1-2 tablets (4-8 mg) by mouth every 8 hours as needed for nausea or vomiting    Splenic artery aneurysm (H)       oxyCODONE IR 5 MG tablet    ROXICODONE    6 tablet    Take 1 tablet (5 mg) by mouth every 6 hours as needed for severe pain    Splenic artery aneurysm (H)

## 2018-07-03 NOTE — LETTER
7/3/2018       RE: Octavia Treviño  [address] [place] [facility] [address]     Dear Colleague,    Thank you for referring your patient, Octavia Treviño, to the [facility] INTERVENTIONAL RADIOLOGY at [facility]. Please see a copy of my visit note below.        INTERVENTIONAL RADIOLOGY CONSULTATION    Name: Octavia Treviño  Age: 27 year old   Referring Physician: Dr. Ramírez   REASON FOR REFERRAL: F/u splenic artery aneurysm stenting/embolization     HPI: Octavia Treviño is a 27 year old female s/p embolization of splenic artery aneurysm, today is here for one month f/u. She is feeling very well today. Her postprocedural course was uneventful and she was able to start working 6 days after the procedure. She reports occasional, nonspecific, mild abdominal pain, otherwise no complaint.    Patient had a contrast enhanced CT today. 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 The images are also showed to the patient and the results are discussed.    Patient had a question about whether this aneurysm would affect her kidney donor candidacy. I explained to her it will be at the discretion of the transplant team. However, it was explained that she will need contrast enhanced CT or MR for f/u which should be taken into account when considering candidacy for renal donor.         PAST MEDICAL HISTORY:   Past Medical History:   Diagnosis Date     Hypothyroidism        PAST SURGICAL HISTORY:   Past Surgical History:   Procedure Laterality Date     NO HISTORY OF SURGERY         FAMILY HISTORY:   Family History   Problem Relation Age of Onset     Pacemaker Mother      Unknown/Adopted Father      Lupus Maternal Grandmother        SOCIAL HISTORY:   Social History   Substance Use Topics     Smoking status: Never Smoker     Smokeless tobacco: Never Used     Alcohol use No        PROBLEM LIST:   Patient Active Problem List    Diagnosis Date Noted     Splenic artery aneurysm (H) 06/04/2018     Priority: Medium     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     Priority: Medium     Hypothyroidism      Priority: Medium     Transplant donor evaluation 05/07/2018     Priority: Medium       MEDICATIONS:   Prescription Medications as of 7/3/2018             IBUPROFEN PO Take 800 mg by mouth every 6 hours as needed for moderate pain    levothyroxine (SYNTHROID/LEVOTHROID) 137 MCG tablet Take 137 mcg by mouth daily    ondansetron (ZOFRAN ODT) 4 MG ODT tab Take 1-2 tablets (4-8 mg) by mouth every 8 hours as needed for nausea or vomiting    oxyCODONE IR (ROXICODONE) 5 MG tablet Take 1 tablet (5 mg) by mouth every 6 hours as needed for severe pain      Facility Administered Medications as of 7/3/2018             iopamidol (ISOVUE-370) solution 100 mL Inject 100 mLs into the vein once          ALLERGIES:   Review of patient's allergies indicates no known allergies.    ROS:  A 12 point review of systems was obtained and is negative other than as outlined above    Physical Examination:   VITALS:   /83  Pulse 64  SpO2 100%  GENERAL APPEARANCE: alert, in no distress  HEENT: normocephalic.  Neck supple  Neck: no JVD  Resp: nonlabored.  CTAb  CV: RRR.  S1 & S2.  No rub  Abd: nontender, soft, normoactive bowel sounds  Lymph/ext: no pedal edema, bilateral radial, PT and DP pulses are symmetrical and 2/2.  Neuro: Oriented x3.  No evidence of focal motor deficits  Mood: appropriate affect    Labs:    BMP RESULTS:  Lab Results   Component Value Date     06/05/2018    POTASSIUM 3.6 06/05/2018    CHLORIDE 107 06/05/2018    CO2 25 06/05/2018    ANIONGAP 7 06/05/2018    GLC 92 06/05/2018    BUN 10 06/05/2018    CR 0.58 06/05/2018    GFRESTIMATED >90 06/05/2018    GFRESTBLACK >90 06/05/2018    ANDREA 8.1 (L) 06/05/2018        CBC RESULTS:  Lab Results   Component Value Date    WBC 5.8 06/04/2018    RBC 4.33 06/04/2018     HGB 13.0 06/04/2018    HCT 39.1 06/04/2018    MCV 90 06/04/2018    MCH 30.0 06/04/2018    MCHC 33.2 06/04/2018    RDW 12.6 06/04/2018     06/04/2018       INR/PTT:  Lab Results   Component Value Date    INR 1.06 06/04/2018    PTT 35 05/24/2018       Diagnostic studies: CTA abdomen and pelvis today demonstrated stable size of mostly thrombosed aneurysm with mild contrast in the sac which is thought to be 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    Assessment: 28 y/o patient s/p splenic artery aneurysm embolization with today's CT demonstrating type II endoleak around the MVP plug. Since the size of the aneurysm is stable, it is decided just to watch for now.    Plan   -RTC in 6 months for f/u with a new multiphasic CTA.    -If the splenic artery aneurysm remains the same in size we will space out her follow ups and start switching between contrast and non-contrast studies.  -If the aneurysm size increases we'll consider rx options such as direct sac stick vs open surgery/ splenectomy.      A total of 30 minutes was spent in care for the patient, of which >50% was spent in counseling.      It was a pleasure to participate in Octavia's care care today.      Beto Easley MD  Asst. Prof., Vascular and Interventional Radiology  [facility]    Physician Attestation   I, Beto Easley, saw this patient and agree with the findings and plan of care as documented in the note.      Items personally reviewed/procedural attestation: vitals, labs, imaging and agree with the interpretation documented in the note and I was present for and supervised the entire visit.  I performed an independent exam and H&P.  My independent assessment and plan is documented above.    Beto Easley MD    CC  Patient Care Team:  [facility], [place], MD as PCP - General (Family Practice)  Pebbles Choudhury MD (Internal Medicine)

## 2018-07-03 NOTE — DISCHARGE INSTRUCTIONS

## 2018-07-11 ENCOUNTER — COMMITTEE REVIEW (OUTPATIENT)
Dept: TRANSPLANT | Facility: CLINIC | Age: 28
End: 2018-07-11

## 2018-07-11 NOTE — COMMITTEE REVIEW
Abdominal Committee Review Note     Evaluation Date:   Committee Review Date: 7/9/2018    Organ being evaluated for: Kidney    Transplant Phase:   Transplant Status:     Transplant Coordinator: Niki Zafar  Transplant Surgeon:       Referring Physician: Referred Self    Primary Diagnosis:   Secondary Diagnosis:     Committee Review Members:  Luanne Carrillo, RD   Pharmacy Alan Gallagher, [facility]    - Clinical Crystal Perez, [facility], Brooke Leyva, [facility]   Transplant Sally Grissom RN, Mark Funes MD, Anny Gallagher, CATHIE, Yarelis Moya LPN, Kristen Corado RN, Denis Burris MD, Elvis Summers MD       Transplant Eligibility: Acceptable Mental Health, see report regarding embolization    Committee Review Decision: Needs Re-presentation    Relative Contraindications: None    Absolute Contraindications: None    Committee Chair Elvis Summers MD verbally attested to the committee's decision.    Committee Discussion Details:     Reviewed Odilon's abnormal evaluation findings:        Also of note: See embolization note.   Dr. Summers will consult with radiology regarding plan.   CT Reviewed and decision to use  kidney for donation.       Next Steps:Send note to Dr. Summers to consult with radiology.  Will put on agenda for next week discussion.

## 2018-07-26 ENCOUNTER — TELEPHONE (OUTPATIENT)
Dept: TRANSPLANT | Facility: CLINIC | Age: 28
End: 2018-07-26

## 2018-07-26 NOTE — TELEPHONE ENCOUNTER
"Octavia returned my call.  I informed her that Dr. Summers and Dr. Easley talked about Octavia's recent splenic artery aneurysm embolization.  At Octavia's f/u appt it was discovered that the procedure was incomplete and tehre is an \"endoleak\".  Octavia will need to f/u with Dr. Easley in one year to ensure the aneurysm has not enlarged and the endoleak has self-sealed.  To do this, Octavia will need to have an additional MRI with contrast.  We are recommending that we keep Octavia's donor status at \"modifiable condition\" until she has had her one year follow-up with Dr. Easley.  We can re-evaluate at that time if Octavia can continue in her kidney donor evaluation.  In the meantime the donor team is aware to bring forward any additional interested living donors (none identified at this time).  Octavia plans on sharing this info with her intended recipient.  Octavia knows how to get a hold of me in the meantime if she has any additional questions/concerns.  I have set an EPIC reminder to follow up with her in one year.     Niki Zafar RN, BSN, CCTN  Living Donor Coordinator  [phone]         "

## 2018-09-18 ENCOUNTER — TELEPHONE (OUTPATIENT)
Dept: INTERVENTIONAL RADIOLOGY/VASCULAR | Facility: CLINIC | Age: 28
End: 2018-09-18

## 2018-09-18 DIAGNOSIS — I72.8 SPLENIC ARTERY ANEURYSM (H): Primary | ICD-10-CM

## 2018-09-18 NOTE — TELEPHONE ENCOUNTER
I returned patients call. She has been having pain in her left upper side for the last 2 weeks.  She states it's a sharp shooting pain that comes and go with exercise and at rest lasts 10 seconds and subsides.  Plan is to discuss with Dr. Easley and then call her later today.  LINUS Aparicio RN, BSN  Interventional Radiology Care Coordinator   Phone:  [phone]

## 2018-09-18 NOTE — TELEPHONE ENCOUNTER
After updating Dr. Easley with patients complaints, recommendation is to have pt get US abd and to see dr. Easley in clinic.  Pt is updated and  to contact.  LINUS Aparicio RN, BSN  Interventional Radiology Care Coordinator   Phone:  [phone]

## 2018-11-05 ENCOUNTER — RADIANT APPOINTMENT (OUTPATIENT)
Dept: ULTRASOUND IMAGING | Facility: CLINIC | Age: 28
End: 2018-11-05
Payer: COMMERCIAL

## 2018-11-05 ENCOUNTER — OFFICE VISIT (OUTPATIENT)
Dept: VASCULAR SURGERY | Facility: CLINIC | Age: 28
End: 2018-11-05
Payer: COMMERCIAL

## 2018-11-05 VITALS — DIASTOLIC BLOOD PRESSURE: 76 MMHG | HEART RATE: 68 BPM | SYSTOLIC BLOOD PRESSURE: 119 MMHG

## 2018-11-05 DIAGNOSIS — I72.8 SPLENIC ARTERY ANEURYSM (H): Primary | ICD-10-CM

## 2018-11-05 DIAGNOSIS — I72.8 SPLENIC ARTERY ANEURYSM (H): ICD-10-CM

## 2018-11-05 ASSESSMENT — PAIN SCALES - GENERAL: PAINLEVEL: NO PAIN (0)

## 2018-11-05 NOTE — LETTER
11/5/2018     RE: Octavia Treviño  [address] [facility] [address]     Dear Colleague,    Thank you for referring your patient, Octavia Treviño, to the [facility] VASCULAR CLINIC at [facility]. Please see a copy of my visit note below.        INTERVENTIONAL RADIOLOGY CONSULTATION    Name: Octavia Treviño  Age: 27 year old   Referring Physician: Dr. Ramírez   REASON FOR REFERRAL: F/u splenic artery aneurysm stenting/embolization     HPI: Octavia Treviño is a 27 year old female s/p embolization of splenic artery aneurysm, today is here for one month f/u. She is feeling very well today. Her postprocedural course was uneventful and she was able to start working 6 days after the procedure. She reports occasional, nonspecific, mild left upper quadrant abdominal pain, otherwise no complaint.    CT angio 7/3/18 demonstrated stable size of the aneurysm which was mostly thrombosed. 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 When we saw her in July, we decided to ultrasound for the next visit to save some radiation. Today she underwent ultrasound which did not demonstrate aneurysm sac well.        PAST MEDICAL HISTORY:   Past Medical History:   Diagnosis Date     Hypothyroidism        PAST SURGICAL HISTORY:   Past Surgical History:   Procedure Laterality Date     NO HISTORY OF SURGERY         FAMILY HISTORY:   Family History   Problem Relation Age of Onset     Pacemaker Mother      Unknown/Adopted Father      Lupus Maternal Grandmother        SOCIAL HISTORY:   Social History   Substance Use Topics     Smoking status: Never Smoker     Smokeless tobacco: Never Used     Alcohol use No       PROBLEM LIST:   Patient Active Problem List    Diagnosis Date Noted     Splenic artery aneurysm (H) 06/04/2018     Priority: Medium     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     Priority: Medium     Hypothyroidism      Priority:  Medium     Transplant donor evaluation 05/07/2018     Priority: Medium       MEDICATIONS:   Prescription Medications as of 11/5/2018             IBUPROFEN PO Take 800 mg by mouth every 6 hours as needed for moderate pain    levothyroxine (SYNTHROID/LEVOTHROID) 137 MCG tablet Take 137 mcg by mouth daily    ondansetron (ZOFRAN ODT) 4 MG ODT tab Take 1-2 tablets (4-8 mg) by mouth every 8 hours as needed for nausea or vomiting    oxyCODONE IR (ROXICODONE) 5 MG tablet Take 1 tablet (5 mg) by mouth every 6 hours as needed for severe pain          ALLERGIES:   Review of patient's allergies indicates no known allergies.    ROS:  A 12 point review of systems was obtained and is negative other than as outlined above    Physical Examination:   VITALS:   /76  Pulse 68  GENERAL APPEARANCE: alert, in no distress  Resp: nonlabored.  CTAb  CV: RRR.  S1 & S2.  No rub  Abd: nontender, soft, normoactive bowel sounds  Neuro: Oriented x3.    Mood: appropriate affect    Labs:    BMP RESULTS:  Lab Results   Component Value Date     06/05/2018    POTASSIUM 3.6 06/05/2018    CHLORIDE 107 06/05/2018    CO2 25 06/05/2018    ANIONGAP 7 06/05/2018    GLC 92 06/05/2018    BUN 10 06/05/2018    CR 0.58 06/05/2018    GFRESTIMATED >90 06/05/2018    GFRESTBLACK >90 06/05/2018    ANDREA 8.1 (L) 06/05/2018        CBC RESULTS:  Lab Results   Component Value Date    WBC 5.8 06/04/2018    RBC 4.33 06/04/2018    HGB 13.0 06/04/2018    HCT 39.1 06/04/2018    MCV 90 06/04/2018    MCH 30.0 06/04/2018    MCHC 33.2 06/04/2018    RDW 12.6 06/04/2018     06/04/2018       INR/PTT:  Lab Results   Component Value Date    INR 1.06 06/04/2018    PTT 35 05/24/2018       Diagnostic studies: Ultrasound from today demonstrates patent stent. The aneurysm is not well delineated.     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 Since the size of the aneurysm is stable,  it is decided just to watch for now.    Plan   -RTC in February for f/u with a new multiphasic CTA.    -If the splenic artery aneurysm remains the same in size we will space out her follow ups.  -If the aneurysm size increases we'll consider rx options such as direct sac stick vs open surgery/ splenectomy.      A total of 30 minutes was spent in care for the patient, of which >50% was spent in counseling.      It was a pleasure to participate in Octavia's care care today.      Tony Lerma MD  Vascular and Interventional Radiology Fellow    I, Dr Beto Easley, was present with the fellow during the history and exam. I discussed the case with the fellow and agree with the findings as documented in the assessment and plan.    Beto Easley MD    Vascular and Interventional Radiology        CC  Patient Care Team:  [facility], [place], MD as PCP - General (Family Practice)  Pebbles Choudhury MD (Internal Medicine)  Beto Easley MD as MD (Radiology)  SELF, REFERRED

## 2018-11-05 NOTE — MR AVS SNAPSHOT
After Visit Summary   11/5/2018    Octavia Treviño    MRN: [id number]           Patient Information     Date Of Birth          1990        Visit Information        Provider Department      11/5/2018 9:00 AM Beto Easley MD [facility]        Today's Diagnoses     Splenic artery aneurysm (H)    -  1       Follow-ups after your visit        Your next 10 appointments already scheduled     Feb 18, 2019  8:40 AM CST   CTA ABDOMEN PELVIS WITH CONTRAST with UCCT2   [facility] CT ([facility])    [address]  [facility] [address] [phone]           How do I prepare for my exam? (Food and drink instructions) **You will have contrast for this exam.** Do not eat or drink for 2 hours before your exam. If you need to take medicine, you may take it with small sips of water. (We may ask you to take liquid medicine as well.)  The day before your exam, drink extra fluids at least six 8-ounce glasses (unless your doctor tells you to restrict your fluids).  How do I prepare for my exam? (Other instructions) Patients over 70 or patients with diabetes or kidney problems: If you haven t had a blood test (creatinine test) within the last 30 days, the Cardiologist/Radiologist may require you to get this test prior to your exam.  What should I wear: Please wear loose clothing, such as a sweat suit or jogging clothes.  Avoid snaps, zippers and other metal. We may ask you to undress and put on a hospital gown.  How long does the exam take: Most scans take less than 20 minutes.  What should I bring: Please bring any scans or X-rays taken at other hospitals, if similar tests were done. Also bring a list of your medicines, including vitamins, minerals and over-the-counter drugs. It is safest to leave personal items at home.  Do I need a :  No  is needed.  What do I need to tell my doctor? Be sure to tell your doctor: * If you have  any allergies. * If there s any chance you are pregnant. * If you are breastfeeding. * If you have diabetes as your medication may need to be adjusted for this exam.  What should I do after the exam: No restrictions, You may resume normal activities.  What is this test: A CT (computed tomography) scan is a series of pictures that allows us to look inside your body. The scanner creates images of the body in cross sections, much like slices of bread. This helps us see any problems more clearly. You may receive contrast (X-ray dye) before or during your scan. Contrast is given through an IV (small needle in your arm).  Who should I call with questions: If you have any questions, please call the Imaging Department where you will have your exam. Directions, parking instructions, and other information is available on our website, [email or web address].            Feb 18, 2019  9:00 AM CST   (Arrive by 8:45 AM)   Return Vascular Visit with Beto Easley MD   [facility] ([facility])    [address] [place] [address] [phone]              Who to contact     Please call your clinic at [phone] to:    Ask questions about your health    Make or cancel appointments    Discuss your medicines    Learn about your test results    Speak to your doctor            Additional Information About Your Visit        MyChart Information     [employer] is an electronic gateway that provides easy, online access to your medical records. With [employer] you can request a clinic appointment, read your test results, renew a prescription or communicate with your care team.     To sign up for [employer] visit the website at [email or web address]   You will be asked to enter the access code listed below, as well as some personal information. Please follow the directions to create your username and password.     Your access code is: [email or web address]  Expires: 1/20/2019  5:30 AM      Your access code will  in 90 days. If you need help or a new code, please contact your [facility] or call [phone] for assistance.        Care EveryWhere ID     This is your Care EveryWhere ID. This could be used by other organizations to access your [facility] medical records  [id number]        Your Vitals Were     Pulse                   68            Blood Pressure from Last 3 Encounters:   18 119/76   18 125/83   18 108/65    Weight from Last 3 Encounters:   18 77.1 kg (170 lb)   18 77.1 kg (170 lb)   18 77.1 kg (170 lb)               Primary Care Provider Office Phone # Fax #    [facility], [phone]       [facility] [address]        Equal Access to Services     [facility]IGO [facility] : Hadii irma morillo hadasho Soomaali, waaxda luqadaha, qaybta kaalmada adeegyada, star rowan . So [facility] [phone].    ATENCIÓN: Si habla español, tiene a fajardo disposición servicios gratuitos de asistencia lingüística. Hector al [phone].    We comply with applicable federal civil rights laws and [place] laws. We do not discriminate on the basis of race, color, national origin, age, disability, sex, sexual orientation, or gender identity.            Thank you!     Thank you for choosing [facility]  for your care. Our goal is always to provide you with excellent care. Hearing back from our patients is one way we can continue to improve our services. Please take a few minutes to complete the written survey that you may receive in the mail after your visit with us. Thank you!             Your Updated Medication List - Protect others around you: Learn how to safely use, store and throw away your medicines at www.disposemymeds.org.          This list is accurate as of 18 11:59 PM.  Always use your most recent med list.                   Brand Name Dispense Instructions  for use Diagnosis    IBUPROFEN PO      Take 800 mg by mouth every 6 hours as needed for moderate pain        levothyroxine 137 MCG tablet    SYNTHROID/LEVOTHROID     Take 137 mcg by mouth daily        ondansetron 4 MG ODT tab    ZOFRAN ODT    10 tablet    Take 1-2 tablets (4-8 mg) by mouth every 8 hours as needed for nausea or vomiting    Splenic artery aneurysm (H)       oxyCODONE IR 5 MG tablet    ROXICODONE    6 tablet    Take 1 tablet (5 mg) by mouth every 6 hours as needed for severe pain    Splenic artery aneurysm (H)

## 2018-11-05 NOTE — PROGRESS NOTES
INTERVENTIONAL RADIOLOGY CONSULTATION    Name: Octavia Treviño  Age: 27 year old   Referring Physician: Dr. Ramírez   REASON FOR REFERRAL: F/u splenic artery aneurysm stenting/embolization     HPI: Octavia Treviño is a 27 year old female s/p embolization of splenic artery aneurysm, today is here for one month f/u. She is feeling very well today. Her postprocedural course was uneventful and she was able to start working 6 days after the procedure. She reports occasional, nonspecific, mild left upper quadrant abdominal pain, otherwise no complaint.    CT angio 7/3/18 demonstrated stable size of the aneurysm which was mostly thrombosed. 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 When we saw her in July, we decided to ultrasound for the next visit to save some radiation. Today she underwent ultrasound which did not demonstrate aneurysm sac well.        PAST MEDICAL HISTORY:   Past Medical History:   Diagnosis Date     Hypothyroidism        PAST SURGICAL HISTORY:   Past Surgical History:   Procedure Laterality Date     NO HISTORY OF SURGERY         FAMILY HISTORY:   Family History   Problem Relation Age of Onset     Pacemaker Mother      Unknown/Adopted Father      Lupus Maternal Grandmother        SOCIAL HISTORY:   Social History   Substance Use Topics     Smoking status: Never Smoker     Smokeless tobacco: Never Used     Alcohol use No       PROBLEM LIST:   Patient Active Problem List    Diagnosis Date Noted     Splenic artery aneurysm (H) 06/04/2018     Priority: Medium     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     Priority: Medium     Hypothyroidism      Priority: Medium     Transplant donor evaluation 05/07/2018     Priority: Medium       MEDICATIONS:   Prescription Medications as of 11/5/2018             IBUPROFEN PO Take 800 mg by mouth every 6 hours as needed for moderate pain    levothyroxine (SYNTHROID/LEVOTHROID) 137 MCG tablet Take 137 mcg  by mouth daily    ondansetron (ZOFRAN ODT) 4 MG ODT tab Take 1-2 tablets (4-8 mg) by mouth every 8 hours as needed for nausea or vomiting    oxyCODONE IR (ROXICODONE) 5 MG tablet Take 1 tablet (5 mg) by mouth every 6 hours as needed for severe pain          ALLERGIES:   Review of patient's allergies indicates no known allergies.    ROS:  A 12 point review of systems was obtained and is negative other than as outlined above    Physical Examination:   VITALS:   /76  Pulse 68  GENERAL APPEARANCE: alert, in no distress  Resp: nonlabored.  CTAb  CV: RRR.  S1 & S2.  No rub  Abd: nontender, soft, normoactive bowel sounds  Neuro: Oriented x3.    Mood: appropriate affect    Labs:    BMP RESULTS:  Lab Results   Component Value Date     06/05/2018    POTASSIUM 3.6 06/05/2018    CHLORIDE 107 06/05/2018    CO2 25 06/05/2018    ANIONGAP 7 06/05/2018    GLC 92 06/05/2018    BUN 10 06/05/2018    CR 0.58 06/05/2018    GFRESTIMATED >90 06/05/2018    GFRESTBLACK >90 06/05/2018    ANDREA 8.1 (L) 06/05/2018        CBC RESULTS:  Lab Results   Component Value Date    WBC 5.8 06/04/2018    RBC 4.33 06/04/2018    HGB 13.0 06/04/2018    HCT 39.1 06/04/2018    MCV 90 06/04/2018    MCH 30.0 06/04/2018    MCHC 33.2 06/04/2018    RDW 12.6 06/04/2018     06/04/2018       INR/PTT:  Lab Results   Component Value Date    INR 1.06 06/04/2018    PTT 35 05/24/2018       Diagnostic studies: Ultrasound from today demonstrates patent stent. The aneurysm is not well delineated.     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 Since the size of the aneurysm is stable, it is decided just to watch for now.    Plan   -RTC in February for f/u with a new multiphasic CTA.    -If the splenic artery aneurysm remains the same in size we will space out her follow ups.  -If the aneurysm size increases we'll consider rx options such as direct sac stick vs open  surgery/ splenectomy.      A total of 30 minutes was spent in care for the patient, of which >50% was spent in counseling.      It was a pleasure to participate in Octavia's care care today.      Tony Lerma MD  Vascular and Interventional Radiology Fellow    I, Dr Beto Easley, was present with the fellow during the history and exam. I discussed the case with the fellow and agree with the findings as documented in the assessment and plan.    Beto Easley MD    Vascular and Interventional Radiology        CC  Patient Care Team:  [facility], [place], MD as PCP - General (Family Practice)  Pebbles Choudhury MD (Internal Medicine)  Beto Easley MD as MD (Radiology)  SELF, REFERRED

## 2018-11-05 NOTE — NURSING NOTE
"Vascular Rooming Note    Octavia Treviño's goals for this visit include:   Chief Complaint   Patient presents with     Follow Up For     Octavia is here today for a follow up on a Anerysm. Octavia notes\" i've been having some pain\"      Tamika Pepe, YANET    "

## 2019-02-11 ENCOUNTER — OFFICE VISIT (OUTPATIENT)
Dept: VASCULAR SURGERY | Facility: CLINIC | Age: 29
End: 2019-02-11
Payer: COMMERCIAL

## 2019-02-11 ENCOUNTER — ANCILLARY PROCEDURE (OUTPATIENT)
Dept: CT IMAGING | Facility: CLINIC | Age: 29
End: 2019-02-11
Payer: COMMERCIAL

## 2019-02-11 VITALS — DIASTOLIC BLOOD PRESSURE: 78 MMHG | HEART RATE: 66 BPM | OXYGEN SATURATION: 99 % | SYSTOLIC BLOOD PRESSURE: 122 MMHG

## 2019-02-11 DIAGNOSIS — I72.8 SPLENIC ARTERY ANEURYSM (H): Primary | ICD-10-CM

## 2019-02-11 DIAGNOSIS — I72.8 SPLENIC ARTERY ANEURYSM (H): ICD-10-CM

## 2019-02-11 RX ORDER — IOPAMIDOL 755 MG/ML
104 INJECTION, SOLUTION INTRAVASCULAR ONCE
Status: COMPLETED | OUTPATIENT
Start: 2019-02-11 | End: 2019-02-11

## 2019-02-11 RX ADMIN — IOPAMIDOL 104 ML: 755 INJECTION, SOLUTION INTRAVASCULAR at 09:47

## 2019-02-11 ASSESSMENT — PAIN SCALES - GENERAL: PAINLEVEL: NO PAIN (0)

## 2019-02-11 NOTE — NURSING NOTE
Vascular Rooming Note     Octavia Treviño's goals for this visit include:   Chief Complaint   Patient presents with     RECHECK     Octavia is being seen for splenic artery aneurysm,no concerns at this time.     Paris Coles LPN

## 2019-02-11 NOTE — LETTER
2/11/2019       RE: Octavia Treviño  [address] [facility] [place] [facility] [address]     Dear Colleague,    Thank you for referring your patient, Octavia Treviño, to the [facility] at [facility]. Please see a copy of my visit note below.        INTERVENTIONAL RADIOLOGY CONSULTATION    Name: Octavia Treviño  Age: 27 year old   Referring Physician: Dr. Ramírez   REASON FOR REFERRAL: F/u splenic artery aneurysm stenting/embolization     HPI: Octavia Treviño is a 27 year old female s/p embolization of splenic artery aneurysm, today is here for 6 month f/u. She is feeling very well today. Her postprocedural course was uneventful and she was able to start working 6 days after the procedure. Her previously reported left upper quadrant pain has resolved.     CT angio from today demonstrates no longer filling of the aneurysm with significantly decreased aneurysmal sac and proper enhancement of the splenic parenchyma.         PAST MEDICAL HISTORY:   Past Medical History:   Diagnosis Date     Hypothyroidism        PAST SURGICAL HISTORY:   Past Surgical History:   Procedure Laterality Date     NO HISTORY OF SURGERY         FAMILY HISTORY:   Family History   Problem Relation Age of Onset     Pacemaker Mother      Unknown/Adopted Father      Lupus Maternal Grandmother        SOCIAL HISTORY:   Social History     Tobacco Use     Smoking status: Never Smoker     Smokeless tobacco: Never Used   Substance Use Topics     Alcohol use: No       PROBLEM LIST:   Patient Active Problem List    Diagnosis Date Noted     Splenic artery aneurysm (H) 06/04/2018     Priority: Medium     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     Priority: Medium     Hypothyroidism      Priority: Medium     Transplant donor evaluation 05/07/2018     Priority: Medium       MEDICATIONS:   Prescription Medications as of 2/11/2019       Rx Number Disp Refills Start End Last Dispensed Date Next Fill Date Owning Pharmacy    IBUPROFEN PO             Sig: Take 800 mg by mouth every 6 hours as needed for moderate pain    Class: Historical    Route: Oral    levothyroxine (SYNTHROID/LEVOTHROID) 137 MCG tablet    11/15/2017 11/15/2018       Sig: Take 137 mcg by mouth daily    Class: Historical    Route: Oral    ondansetron (ZOFRAN ODT) 4 MG ODT tab  10 tablet 0 6/5/2018        Sig: Take 1-2 tablets (4-8 mg) by mouth every 8 hours as needed for nausea or vomiting    Class: Local Print    Route: Oral    oxyCODONE IR (ROXICODONE) 5 MG tablet  6 tablet 0 6/5/2018        Sig: Take 1 tablet (5 mg) by mouth every 6 hours as needed for severe pain    Class: Local Print    Earliest Fill Date: 6/5/2018    Route: Oral      Clinic-Administered Medications as of 2/11/2019       Dose Frequency Start End    iopamidol (ISOVUE-370) solution 104 mL 104 mL ONCE 2/11/2019 2/11/2019    Sig: Inject 104 mLs into the vein once    Route: Intravenous          ALLERGIES:   Patient has no known allergies.    ROS:  A 12 point review of systems was obtained and is negative other than as outlined above    Physical Examination:   VITALS:   /78 (BP Location: Left arm, Patient Position: Chair, Cuff Size: Adult Regular)   Pulse 66   SpO2 99%   GENERAL APPEARANCE: alert, in no distress  Resp: nonlabored.  CTAb  CV: RRR.  S1 & S2.  No rub  Abd: nontender, soft, normoactive bowel sounds  Neuro: Oriented x3.    Mood: appropriate affect    Labs:    BMP RESULTS:  Lab Results   Component Value Date     06/05/2018    POTASSIUM 3.6 06/05/2018    CHLORIDE 107 06/05/2018    CO2 25 06/05/2018    ANIONGAP 7 06/05/2018    GLC 92 06/05/2018    BUN 10 06/05/2018    CR 0.58 06/05/2018    GFRESTIMATED >90 06/05/2018    GFRESTBLACK >90 06/05/2018    ANDREA 8.1 (L) 06/05/2018        CBC RESULTS:  Lab Results   Component Value Date    WBC 5.8 06/04/2018    RBC 4.33 06/04/2018    HGB 13.0 06/04/2018    HCT 39.1 06/04/2018    MCV 90 06/04/2018    MCH 30.0 06/04/2018    MCHC 33.2 06/04/2018    RDW 12.6  06/04/2018     06/04/2018       INR/PTT:  Lab Results   Component Value Date    INR 1.06 06/04/2018    PTT 35 05/24/2018       Diagnostic studies: CT from today demonstrates near complete thrombosis of the aneurysm sac with significant decrease in size.    Assessment: 30 y/o patient s/p splenic artery aneurysm embolization.  Patient is now symptom-free with  today's CT demonstrating no longer filling of the aneurysm sac and significant decrease in size.    Plan   -RTC in 6 months for f/u with a new single phase early arterial CTA.    -We will probably start yearly follow-ups if the aneurysm sac is the same or decreased in our next follow up.     It was a pleasure to participate in Octavia's care care today.    A total of 20 minutes was spent on this clinic visit, more than half of which was in direct counseling and coordination of the care.    Tony Lerma MD  Vascular and Interventional Radiology Fellow    I, Dr Beto Easley, was present with the fellow during the history and exam. I discussed the case with the fellow and agree with the findings as documented in the assessment and plan.    Beto Easley MD  Vascular and interventional radiology  [facility]    CC  Patient Care Team:  [facility], [place], MD as PCP - General (Family Practice)  Pebbles Choudhury MD (Internal Medicine)  Beto Easley MD as MD (Radiology)  SELF, REFERRED

## 2019-02-11 NOTE — PROGRESS NOTES
INTERVENTIONAL RADIOLOGY CONSULTATION    Name: Octavia Treviño  Age: 27 year old   Referring Physician: Dr. Ramírez   REASON FOR REFERRAL: F/u splenic artery aneurysm stenting/embolization     HPI: Octavia Treviño is a 27 year old female s/p embolization of splenic artery aneurysm, today is here for 6 month f/u. She is feeling very well today. Her postprocedural course was uneventful and she was able to start working 6 days after the procedure. Her previously reported left upper quadrant pain has resolved.     CT angio from today demonstrates no longer filling of the aneurysm with significantly decreased aneurysmal sac and proper enhancement of the splenic parenchyma.         PAST MEDICAL HISTORY:   Past Medical History:   Diagnosis Date     Hypothyroidism        PAST SURGICAL HISTORY:   Past Surgical History:   Procedure Laterality Date     NO HISTORY OF SURGERY         FAMILY HISTORY:   Family History   Problem Relation Age of Onset     Pacemaker Mother      Unknown/Adopted Father      Lupus Maternal Grandmother        SOCIAL HISTORY:   Social History     Tobacco Use     Smoking status: Never Smoker     Smokeless tobacco: Never Used   Substance Use Topics     Alcohol use: No       PROBLEM LIST:   Patient Active Problem List    Diagnosis Date Noted     Splenic artery aneurysm (H) 06/04/2018     Priority: Medium     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     Priority: Medium     Hypothyroidism      Priority: Medium     Transplant donor evaluation 05/07/2018     Priority: Medium       MEDICATIONS:   Prescription Medications as of 2/11/2019       Rx Number Disp Refills Start End Last Dispensed Date Next Fill Date Owning Pharmacy    IBUPROFEN PO            Sig: Take 800 mg by mouth every 6 hours as needed for moderate pain    Class: Historical    Route: Oral    levothyroxine (SYNTHROID/LEVOTHROID) 137 MCG tablet    11/15/2017 11/15/2018       Sig: Take 137 mcg by mouth daily    Class: Historical    Route: Oral    ondansetron  (ZOFRAN ODT) 4 MG ODT tab  10 tablet 0 6/5/2018        Sig: Take 1-2 tablets (4-8 mg) by mouth every 8 hours as needed for nausea or vomiting    Class: Local Print    Route: Oral    oxyCODONE IR (ROXICODONE) 5 MG tablet  6 tablet 0 6/5/2018        Sig: Take 1 tablet (5 mg) by mouth every 6 hours as needed for severe pain    Class: Local Print    Earliest Fill Date: 6/5/2018    Route: Oral      Clinic-Administered Medications as of 2/11/2019       Dose Frequency Start End    iopamidol (ISOVUE-370) solution 104 mL 104 mL ONCE 2/11/2019 2/11/2019    Sig: Inject 104 mLs into the vein once    Route: Intravenous          ALLERGIES:   Patient has no known allergies.    ROS:  A 12 point review of systems was obtained and is negative other than as outlined above    Physical Examination:   VITALS:   /78 (BP Location: Left arm, Patient Position: Chair, Cuff Size: Adult Regular)   Pulse 66   SpO2 99%   GENERAL APPEARANCE: alert, in no distress  Resp: nonlabored.  CTAb  CV: RRR.  S1 & S2.  No rub  Abd: nontender, soft, normoactive bowel sounds  Neuro: Oriented x3.    Mood: appropriate affect    Labs:    BMP RESULTS:  Lab Results   Component Value Date     06/05/2018    POTASSIUM 3.6 06/05/2018    CHLORIDE 107 06/05/2018    CO2 25 06/05/2018    ANIONGAP 7 06/05/2018    GLC 92 06/05/2018    BUN 10 06/05/2018    CR 0.58 06/05/2018    GFRESTIMATED >90 06/05/2018    GFRESTBLACK >90 06/05/2018    ANDREA 8.1 (L) 06/05/2018        CBC RESULTS:  Lab Results   Component Value Date    WBC 5.8 06/04/2018    RBC 4.33 06/04/2018    HGB 13.0 06/04/2018    HCT 39.1 06/04/2018    MCV 90 06/04/2018    MCH 30.0 06/04/2018    MCHC 33.2 06/04/2018    RDW 12.6 06/04/2018     06/04/2018       INR/PTT:  Lab Results   Component Value Date    INR 1.06 06/04/2018    PTT 35 05/24/2018       Diagnostic studies: CT from today demonstrates near complete thrombosis of the aneurysm sac with significant decrease in size.    Assessment: 28 y/o  patient s/p splenic artery aneurysm embolization.  Patient is now symptom-free with  today's CT demonstrating no longer filling of the aneurysm sac and significant decrease in size.    Plan   -RTC in 6 months for f/u with a new single phase early arterial CTA.    -We will probably start yearly follow-ups if the aneurysm sac is the same or decreased in our next follow up.     It was a pleasure to participate in Octavia's care care today.    A total of 20 minutes was spent on this clinic visit, more than half of which was in direct counseling and coordination of the care.    Tony Lerma MD  Vascular and Interventional Radiology Fellow    I, Dr Beto Easley, was present with the fellow during the history and exam. I discussed the case with the fellow and agree with the findings as documented in the assessment and plan.    Beto Easley MD  Vascular and interventional radiology  [facility]      CC  Patient Care Team:  [facility], [place], MD as PCP - General (Family Practice)  Pebbles Choudhury MD (Internal Medicine)  Beto Easley MD as MD (Radiology)  SELF, REFERRED

## 2019-02-11 NOTE — DISCHARGE INSTRUCTIONS

## 2019-08-19 ENCOUNTER — OFFICE VISIT (OUTPATIENT)
Dept: VASCULAR SURGERY | Facility: CLINIC | Age: 29
End: 2019-08-19
Payer: COMMERCIAL

## 2019-08-19 ENCOUNTER — ANCILLARY PROCEDURE (OUTPATIENT)
Dept: CT IMAGING | Facility: CLINIC | Age: 29
End: 2019-08-19

## 2019-08-19 VITALS — HEART RATE: 60 BPM | DIASTOLIC BLOOD PRESSURE: 75 MMHG | OXYGEN SATURATION: 97 % | SYSTOLIC BLOOD PRESSURE: 108 MMHG

## 2019-08-19 DIAGNOSIS — Z95.9 S/P ARTERIAL STENT: ICD-10-CM

## 2019-08-19 DIAGNOSIS — I72.8 SPLENIC ARTERY ANEURYSM (H): ICD-10-CM

## 2019-08-19 DIAGNOSIS — I72.8 SPLENIC ARTERY ANEURYSM (H): Primary | ICD-10-CM

## 2019-08-19 RX ORDER — IOPAMIDOL 755 MG/ML
100 INJECTION, SOLUTION INTRAVASCULAR ONCE
Status: COMPLETED | OUTPATIENT
Start: 2019-08-19 | End: 2019-08-19

## 2019-08-19 RX ADMIN — IOPAMIDOL 100 ML: 755 INJECTION, SOLUTION INTRAVASCULAR at 09:33

## 2019-08-19 ASSESSMENT — PAIN SCALES - GENERAL: PAINLEVEL: NO PAIN (0)

## 2019-08-19 NOTE — LETTER
8/19/2019       RE: Octavia Treviño  [address] [place] [address]     Dear Colleague,    Thank you for referring your patient, Octavia Treviño, to the [facility]ASCULAR [facility] at [facility]. Please see a copy of my visit note below.        INTERVENTIONAL RADIOLOGY CONSULTATION    Name: Octavia Treviño  Age: 28 year old   Referring Physician: Self referred  REASON FOR REFERRAL: Splenic artery aneurysm, status post stent graft placement    HPI: Octavia is a well-known patient to me, very healthy, with an incidental discovery of a splenic artery aneurysm on 5/24/2018, who underwent embolization of the small branch (to avoid endoleak) and preservation of the main trunk of the splenic artery with the use of a stent graft on 6/4/2018.  She initially had findings suggestive of a small endoleak without growth of the aneurysm size on the first post procedure CT on 7/30/2018.  This did resolve with no demonstrable endoleak on CTA dated 2/11/2019, with significant decrease in size of the aneurysm sac.  These findings have remained stable with continued shrinkage of the aneurysm sac and no residual demonstrable endoleak on CT scan dated 8/19/2019.  She has had some episodes of abdominal pain which has become less frequent.  Her spleen remains well perfused on the imaging and there is no signs of splenic infarction.  She is appropriately anxious about the results of her test and was relieved with learning about the results today.  She is accompanied by her  who has been very supportive and involved in her care.  She is able to perform her daily activities, including attending her workplace and caring for her children and family.  She has the expected level of exercise without any issues.  Her groin access site has remained free of any issues.    PAST MEDICAL HISTORY:   Past Medical History:   Diagnosis Date     Hypothyroidism        PAST SURGICAL HISTORY:   Past Surgical  History:   Procedure Laterality Date     NO HISTORY OF SURGERY         FAMILY HISTORY:   Family History   Problem Relation Age of Onset     Pacemaker Mother      Unknown/Adopted Father      Lupus Maternal Grandmother        SOCIAL HISTORY:   Social History     Tobacco Use     Smoking status: Never Smoker     Smokeless tobacco: Never Used   Substance Use Topics     Alcohol use: No       PROBLEM LIST:   Patient Active Problem List    Diagnosis Date Noted     Splenic artery aneurysm (H) 06/04/2018     Priority: Medium     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     Priority: Medium     Hypothyroidism      Priority: Medium     Transplant donor evaluation 05/07/2018     Priority: Medium       MEDICATIONS:   Prescription Medications as of 8/30/2019       Rx Number Disp Refills Start End Last Dispensed Date Next Fill Date Owning Pharmacy    IBUPROFEN PO            Sig: Take 800 mg by mouth every 6 hours as needed for moderate pain    Class: Historical    Route: Oral    levothyroxine (SYNTHROID/LEVOTHROID) 137 MCG tablet    11/15/2017 11/15/2018       Sig: Take 137 mcg by mouth daily    Class: Historical    Route: Oral    ondansetron (ZOFRAN ODT) 4 MG ODT tab  10 tablet 0 6/5/2018        Sig: Take 1-2 tablets (4-8 mg) by mouth every 8 hours as needed for nausea or vomiting    Class: Local Print    Route: Oral    oxyCODONE IR (ROXICODONE) 5 MG tablet  6 tablet 0 6/5/2018        Sig: Take 1 tablet (5 mg) by mouth every 6 hours as needed for severe pain    Class: Local Print    Earliest Fill Date: 6/5/2018    Route: Oral          ALLERGIES:   Patient has no known allergies.    ROS:  An 11 point review of system was performed and pertinent negative and positives are mentioned in HPI.    Physical Examination:   VITALS:   /75 (BP Location: Left arm, Patient Position: Chair, Cuff Size: Adult Regular)   Pulse 60   SpO2 97%   General:  Pt in chair comfortably.  No acute distress  HEENT: normocephalic.  Neck: no JVD  Resp:  nonlabored.  CV: RRR.  2+ DP and PT pulses bilaterally.  Abd: nontender, soft,  Lymph: no pedal edema  Neuro: Oriented x3.  No evidence of focal motor deficits  Mood: appropriate affect    Labs:    BMP RESULTS:  Lab Results   Component Value Date     06/05/2018    POTASSIUM 3.6 06/05/2018    CHLORIDE 107 06/05/2018    CO2 25 06/05/2018    ANIONGAP 7 06/05/2018    GLC 92 06/05/2018    BUN 10 06/05/2018    CR 0.58 06/05/2018    GFRESTIMATED >90 06/05/2018    GFRESTBLACK >90 06/05/2018    ANDREA 8.1 (L) 06/05/2018        CBC RESULTS:  Lab Results   Component Value Date    WBC 5.8 06/04/2018    RBC 4.33 06/04/2018    HGB 13.0 06/04/2018    HCT 39.1 06/04/2018    MCV 90 06/04/2018    MCH 30.0 06/04/2018    MCHC 33.2 06/04/2018    RDW 12.6 06/04/2018     06/04/2018       INR/PTT:  Lab Results   Component Value Date    INR 1.06 06/04/2018    PTT 35 05/24/2018       Diagnostic studies: she initially had findings suggestive of a small endoleak without growth of the aneurysm size on the first post procedure CT on 7/30/2018.  This did resolve with no demonstrable endoleak on CTA dated 2/11/2019, with significant decrease in size of the aneurysm sac.  These findings have remained stable with continued shrinkage of the aneurysm sac and no residual demonstrable endoleak on CT scan dated 8/19/2019.    Assessment 28-year-old female, with incidental finding of splenic artery aneurysm, status post endovascular treatment with stent graft and microvascular plug, with favorable imaging and clinical outcome.  The aneurysm continues to shrink without evidence of endoleak. I had a very comprehensive discussion with patient and her  regarding the follow-up options and how vigorously wants to monitor, particularly with emphasis on the risks and benefits of using of ionizing radiations.  We mutually agreed to perform another follow-up with a single phase CTA in 1 year from now.   Plan  -Return to clinic in 1 year with a new  single phase CTA of abdomen only (without pelvis)  -She is aware of the symptoms that would warrant seeking medical attention, particularly abdominal pain and specifically left upper quadrant abdominal pain.    It was a pleasure to participate in Octavia's care today.    I, Dr Beto Easley, personally performed the history and exam. I have personally the document of my findings as well as the assessment and plan.    A total of 35 minutes was spent on this clinic visit, more than half was on direct counseling and coordination of the care.    Beto Easley MD    Vascular and Interventional Radiolgy  [facility]  Patient Care Team:  [facility], [place], MD as PCP - General (Family Practice)  Pebbles Choudhury MD (Internal Medicine)  Beto Easley MD as MD (Radiology)  SELF, REFERRED

## 2019-08-19 NOTE — NURSING NOTE
Vascular Rooming Note     Octavia Treviño's goals for this visit include:   Chief Complaint   Patient presents with     Follow Up     Octavia, is being seentoday fora follow up splenic artery aneurysm, no changes since last visit, still experiencing a little pain,  no new concerns, as reported by patient.     Paris Coles LPN

## 2019-08-30 NOTE — PROGRESS NOTES
INTERVENTIONAL RADIOLOGY CONSULTATION    Name: Octavia Treviño  Age: 28 year old   Referring Physician: Self referred  REASON FOR REFERRAL: Splenic artery aneurysm, status post stent graft placement    HPI: Octavia is a well-known patient to me, very healthy, with an incidental discovery of a splenic artery aneurysm on 5/24/2018, who underwent embolization of the small branch (to avoid endoleak) and preservation of the main trunk of the splenic artery with the use of a stent graft on 6/4/2018.  She initially had findings suggestive of a small endoleak without growth of the aneurysm size on the first post procedure CT on 7/30/2018.  This did resolve with no demonstrable endoleak on CTA dated 2/11/2019, with significant decrease in size of the aneurysm sac.  These findings have remained stable with continued shrinkage of the aneurysm sac and no residual demonstrable endoleak on CT scan dated 8/19/2019.  She has had some episodes of abdominal pain which has become less frequent.  Her spleen remains well perfused on the imaging and there is no signs of splenic infarction.  She is appropriately anxious about the results of her test and was relieved with learning about the results today.  She is accompanied by her  who has been very supportive and involved in her care.  She is able to perform her daily activities, including attending her workplace and caring for her children and family.  She has the expected level of exercise without any issues.  Her groin access site has remained free of any issues.    PAST MEDICAL HISTORY:   Past Medical History:   Diagnosis Date     Hypothyroidism        PAST SURGICAL HISTORY:   Past Surgical History:   Procedure Laterality Date     NO HISTORY OF SURGERY         FAMILY HISTORY:   Family History   Problem Relation Age of Onset     Pacemaker Mother      Unknown/Adopted Father      Lupus Maternal Grandmother        SOCIAL HISTORY:   Social History     Tobacco Use     Smoking  status: Never Smoker     Smokeless tobacco: Never Used   Substance Use Topics     Alcohol use: No       PROBLEM LIST:   Patient Active Problem List    Diagnosis Date Noted     Splenic artery aneurysm (H) 06/04/2018     Priority: Medium     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     Priority: Medium     Hypothyroidism      Priority: Medium     Transplant donor evaluation 05/07/2018     Priority: Medium       MEDICATIONS:   Prescription Medications as of 8/30/2019       Rx Number Disp Refills Start End Last Dispensed Date Next Fill Date Owning Pharmacy    IBUPROFEN PO            Sig: Take 800 mg by mouth every 6 hours as needed for moderate pain    Class: Historical    Route: Oral    levothyroxine (SYNTHROID/LEVOTHROID) 137 MCG tablet    11/15/2017 11/15/2018       Sig: Take 137 mcg by mouth daily    Class: Historical    Route: Oral    ondansetron (ZOFRAN ODT) 4 MG ODT tab  10 tablet 0 6/5/2018        Sig: Take 1-2 tablets (4-8 mg) by mouth every 8 hours as needed for nausea or vomiting    Class: Local Print    Route: Oral    oxyCODONE IR (ROXICODONE) 5 MG tablet  6 tablet 0 6/5/2018        Sig: Take 1 tablet (5 mg) by mouth every 6 hours as needed for severe pain    Class: Local Print    Earliest Fill Date: 6/5/2018    Route: Oral          ALLERGIES:   Patient has no known allergies.    ROS:  An 11 point review of system was performed and pertinent negative and positives are mentioned in HPI.        Physical Examination:   VITALS:   /75 (BP Location: Left arm, Patient Position: Chair, Cuff Size: Adult Regular)   Pulse 60   SpO2 97%   General:  Pt in chair comfortably.  No acute distress  HEENT: normocephalic.  Neck: no JVD  Resp: nonlabored.  CV: RRR.  2+ DP and PT pulses bilaterally.  Abd: nontender, soft,  Lymph: no pedal edema  Neuro: Oriented x3.  No evidence of focal motor deficits  Mood: appropriate affect        Labs:    BMP RESULTS:  Lab Results   Component Value Date     06/05/2018    POTASSIUM 3.6  06/05/2018    CHLORIDE 107 06/05/2018    CO2 25 06/05/2018    ANIONGAP 7 06/05/2018    GLC 92 06/05/2018    BUN 10 06/05/2018    CR 0.58 06/05/2018    GFRESTIMATED >90 06/05/2018    GFRESTBLACK >90 06/05/2018    ANDREA 8.1 (L) 06/05/2018        CBC RESULTS:  Lab Results   Component Value Date    WBC 5.8 06/04/2018    RBC 4.33 06/04/2018    HGB 13.0 06/04/2018    HCT 39.1 06/04/2018    MCV 90 06/04/2018    MCH 30.0 06/04/2018    MCHC 33.2 06/04/2018    RDW 12.6 06/04/2018     06/04/2018       INR/PTT:  Lab Results   Component Value Date    INR 1.06 06/04/2018    PTT 35 05/24/2018       Diagnostic studies: she initially had findings suggestive of a small endoleak without growth of the aneurysm size on the first post procedure CT on 7/30/2018.  This did resolve with no demonstrable endoleak on CTA dated 2/11/2019, with significant decrease in size of the aneurysm sac.  These findings have remained stable with continued shrinkage of the aneurysm sac and no residual demonstrable endoleak on CT scan dated 8/19/2019.    Assessment 28-year-old female, with incidental finding of splenic artery aneurysm, status post endovascular treatment with stent graft and microvascular plug, with favorable imaging and clinical outcome.  The aneurysm continues to shrink without evidence of endoleak. I had a very comprehensive discussion with patient and her  regarding the follow-up options and how vigorously wants to monitor, particularly with emphasis on the risks and benefits of using of ionizing radiations.  We mutually agreed to perform another follow-up with a single phase CTA in 1 year from now.   Plan  -Return to clinic in 1 year with a new single phase CTA of abdomen only (without pelvis)  -She is aware of the symptoms that would warrant seeking medical attention, particularly abdominal pain and specifically left upper quadrant abdominal pain.    It was a pleasure to participate in Octavia's care today.    I, Dr Pérez  Tracee, personally performed the history and exam. I have personally the document of my findings as well as the assessment and plan.    A total of 35 minutes was spent on this clinic visit, more than half was on direct counseling and coordination of the care.    Beto Easley MD    Vascular and Interventional Radiolgy  [facility]  Patient Care Team:  [facility], [place], MD as PCP - General (Family Practice)  Pebbles Choudhury MD (Internal Medicine)  Beto Easley MD as MD (Radiology)  SELF, REFERRED

## 2019-09-11 ENCOUNTER — TELEPHONE (OUTPATIENT)
Dept: TRANSPLANT | Facility: CLINIC | Age: 29
End: 2019-09-11

## 2019-09-11 NOTE — TELEPHONE ENCOUNTER
Email sent to Octavia 9/11/19 11:43am    Hossein Dudley!  I made a note on my calendar to follow-up with you to see how you are doing after your splenic artery embolization, and to see if you had any interest in returning to kidney donor evaluation.   I know circumstances can change a lot in a year so please do not feel any pressure from me!  I just wanted to follow-through on our last conversation and reach-out at the 1 year kvng.  If you do want to continue to explore kidney donation I am happy to have our donor team review your chart and see if you could come back to the donor evaluation.  Either way, I hope you and your family are well!     Sincerely,    Niki Zafar RN, BSN, CCTN     Living Donor Transplant Coordinator  [phone] (office)  [phone] (pager)  [email or web address]

## 2020-02-25 ENCOUNTER — TELEPHONE (OUTPATIENT)
Dept: VASCULAR SURGERY | Facility: CLINIC | Age: 30
End: 2020-02-25

## 2020-02-25 DIAGNOSIS — I72.8 SPLENIC ARTERY ANEURYSM (H): Primary | ICD-10-CM

## 2020-02-25 DIAGNOSIS — Z95.9 S/P ARTERIAL STENT: ICD-10-CM

## 2020-02-25 NOTE — TELEPHONE ENCOUNTER
I called and spoke with Octavia.  She had some episodes of left sided pain that took her breath away last adria.  She would like to see Dr Easley and not wait for annual f/up.  Will order CTA and have clinic coordinators reach out for appointments.  LINUS Aparicio RN, BSN  Interventional Radiology Nurse Coordinator   Phone:  [phone]

## 2020-06-22 DIAGNOSIS — I72.8 SPLENIC ARTERY ANEURYSM (H): Primary | ICD-10-CM

## 2020-09-17 ENCOUNTER — ANCILLARY PROCEDURE (OUTPATIENT)
Dept: CT IMAGING | Facility: CLINIC | Age: 30
End: 2020-09-17
Attending: RADIOLOGY
Payer: COMMERCIAL

## 2020-09-17 DIAGNOSIS — I72.8 SPLENIC ARTERY ANEURYSM (H): ICD-10-CM

## 2020-09-17 RX ORDER — IOPAMIDOL 755 MG/ML
100 INJECTION, SOLUTION INTRAVASCULAR ONCE
Status: COMPLETED | OUTPATIENT
Start: 2020-09-17 | End: 2020-09-17

## 2020-09-17 RX ADMIN — IOPAMIDOL 100 ML: 755 INJECTION, SOLUTION INTRAVASCULAR at 08:28

## 2020-09-21 ENCOUNTER — VIRTUAL VISIT (OUTPATIENT)
Dept: VASCULAR SURGERY | Facility: CLINIC | Age: 30
End: 2020-09-21
Payer: COMMERCIAL

## 2020-09-21 DIAGNOSIS — I72.8 SPLENIC ARTERY ANEURYSM (H): Primary | ICD-10-CM

## 2020-09-21 ASSESSMENT — PAIN SCALES - GENERAL: PAINLEVEL: NO PAIN (0)

## 2020-09-21 NOTE — NURSING NOTE
Vascular Rooming Note     Octavia Treviño's goals for this visit include:   Chief Complaint   Patient presents with     DEYANIRA Dudley, is participating in a virtual visit today for a follow up splenic artery aneurysm, is feeling good, no concerns , as reported by patient.     Paris Coles LPN

## 2020-09-21 NOTE — LETTER
9/21/2020       RE: Octavia Treviño  [address] [facility] [address]     Dear Colleague,    Thank you for referring your patient, Octavia Treviño, to the [facility] VASCULAR CLINIC at [facility]. Please see a copy of my visit note below.    INTERVENTIONAL RADIOLOGY CONSULTATION    Name: Octavia Treviño  Age: 29 year old   Referring Physician: Dr. Ramírez   REASON FOR REFERRAL: Splenic artery aneurysm     HPI: Healthy 30 yo pt with incidental discovery of splenic artery aneurysm, treated with embolization of side-branch and vessel preserving stent graft placement 6/4/2018.  No symptoms of abdominal pain or any other new clinical findings concerning for aneurysm.  CTA shows patent stent, perfused spleen, no flow to the aneurysm sac.  Doing well.    PAST MEDICAL HISTORY:   Past Medical History:   Diagnosis Date     Hypothyroidism        PAST SURGICAL HISTORY:   Past Surgical History:   Procedure Laterality Date     NO HISTORY OF SURGERY         FAMILY HISTORY:   Family History   Problem Relation Age of Onset     Pacemaker Mother      Unknown/Adopted Father      Lupus Maternal Grandmother        SOCIAL HISTORY:   Social History     Tobacco Use     Smoking status: Never Smoker     Smokeless tobacco: Never Used   Substance Use Topics     Alcohol use: No       PROBLEM LIST:   Patient Active Problem List    Diagnosis Date Noted     Splenic artery aneurysm (H) 06/04/2018     Priority: Medium     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     Priority: Medium     Hypothyroidism      Priority: Medium     Transplant donor evaluation 05/07/2018     Priority: Medium       MEDICATIONS:   Prescription Medications as of 9/21/2020       Rx Number Disp Refills Start End Last Dispensed Date Next Fill Date Owning Pharmacy    IBUPROFEN PO            Sig: Take 800 mg by mouth every 6 hours as needed for moderate pain    Class: Historical    Route: Oral    levothyroxine (SYNTHROID/LEVOTHROID) 137 MCG tablet     11/15/2017 11/15/2018       Sig: Take 137 mcg by mouth daily    Class: Historical    Route: Oral    ondansetron (ZOFRAN ODT) 4 MG ODT tab  10 tablet 0 6/5/2018        Sig: Take 1-2 tablets (4-8 mg) by mouth every 8 hours as needed for nausea or vomiting    Class: Local Print    Route: Oral    oxyCODONE IR (ROXICODONE) 5 MG tablet  6 tablet 0 6/5/2018        Sig: Take 1 tablet (5 mg) by mouth every 6 hours as needed for severe pain    Class: Local Print    Earliest Fill Date: 6/5/2018    Route: Oral          ALLERGIES:   Patient has no known allergies.    ROS:  An 11 point review of system was performed and pertinent negative and positives are mentioned in HPI.      Physical Examination:   VITALS:   There were no vitals taken for this visit.  No physical exam.    Labs:  BMP RESULTS:  Lab Results   Component Value Date     06/05/2018    POTASSIUM 3.6 06/05/2018    CHLORIDE 107 06/05/2018    CO2 25 06/05/2018    ANIONGAP 7 06/05/2018    GLC 92 06/05/2018    BUN 10 06/05/2018    CR 0.58 06/05/2018    GFRESTIMATED >90 06/05/2018    GFRESTBLACK >90 06/05/2018    ANDREA 8.1 (L) 06/05/2018        CBC RESULTS:  Lab Results   Component Value Date    WBC 5.8 06/04/2018    RBC 4.33 06/04/2018    HGB 13.0 06/04/2018    HCT 39.1 06/04/2018    MCV 90 06/04/2018    MCH 30.0 06/04/2018    MCHC 33.2 06/04/2018    RDW 12.6 06/04/2018     06/04/2018       INR/PTT:  Lab Results   Component Value Date    INR 1.06 06/04/2018    PTT 35 05/24/2018       Diagnostic studies: CTA 9/17/2020:   Patent splenic artery stent graft, with stable involution  of prior splenic artery aneurysm. No evidence of endoleak on this  single phase arterial examination. No evidence of splenic infarct.    Assessment Healthy 28 yo pt with successful treatment of asymptomatic splenic artery aneurysm, patent stent without endoleak or flow to the aneurysm sac and continued perfusion of the spleen.  Plan   - RTC in 1 year  - New CTA abdomen, single phase  arterial  - Will decide about further follow up intervals at the next visit    I, Dr Beto Easley, performed the entirety of this telemedicine visit.      Again, thank you for allowing me to participate in the care of your patient.  Sincerely,    Beto Easley MD    Vascular and Interventional Radiolgy  [facility]  Patient Care Team:  [facility], [place], MD as PCP - General (Family Practice)  Pebbles Choudhury MD (Internal Medicine)  Beto Easley MD as MD (Radiology)  SELF, REFERRED

## 2020-09-21 NOTE — LETTER
"9/21/2020       RE: Octavia Treviño  [address] [place] [address]     Dear Colleague,    Thank you for referring your patient, Octavia Treviño, to the [facility] at [facility]. Please see a copy of my visit note below.    Octavia Treviño is a 29 year old female who is being evaluated via a billable video visit.      The patient has been notified of following:     \"This video visit will be conducted via a call between you and your physician/provider. We have found that certain health care needs can be provided without the need for an in-person physical exam.  This service lets us provide the care you need with a video conversation.  If a prescription is necessary we can send it directly to your pharmacy.  If lab work is needed we can place an order for that and you can then stop by our lab to have the test done at a later time.    Video visits are billed at different rates depending on your insurance coverage.  Please reach out to your insurance provider with any questions.    If during the course of the call the physician/provider feels a video visit is not appropriate, you will not be charged for this service.\"    Patient has given verbal consent for Video visit? Yes  How would you like to obtain your AVS? MyChart  If you are dropped from the video visit, the video invite should be resent to: Text to cell phone: [phone] [email or web address]  Will anyone else be joining your video visit? No        Video-Visit Details    Type of service:  Video Visit    Video Start Time: 0820  Video End Time: 0835    Originating Location (pt. Location): Other Car    Distant Location (provider location):  [facility]     Platform used for Video Visit: Norris Easley MD         INTERVENTIONAL RADIOLOGY CONSULTATION    Name: Octavia Treviño  Age: 29 year old   Referring Physician: Dr. Ramírez   REASON FOR REFERRAL: Splenic artery aneurysm     HPI: Healthy 29 " yo pt with incidental discovery of splenic artery aneurysm, treated with embolization of side-branch and vessel preserving stent graft placement 6/4/2018.  No symptoms of abdominal pain or any other new clinical findings concerning for aneurysm.  CTA shows patent stent, perfused spleen, no flow to the aneurysm sac.  Doing well.    PAST MEDICAL HISTORY:   Past Medical History:   Diagnosis Date     Hypothyroidism        PAST SURGICAL HISTORY:   Past Surgical History:   Procedure Laterality Date     NO HISTORY OF SURGERY         FAMILY HISTORY:   Family History   Problem Relation Age of Onset     Pacemaker Mother      Unknown/Adopted Father      Lupus Maternal Grandmother        SOCIAL HISTORY:   Social History     Tobacco Use     Smoking status: Never Smoker     Smokeless tobacco: Never Used   Substance Use Topics     Alcohol use: No       PROBLEM LIST:   Patient Active Problem List    Diagnosis Date Noted     Splenic artery aneurysm (H) 06/04/2018     Priority: Medium     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     Priority: Medium     Hypothyroidism      Priority: Medium     Transplant donor evaluation 05/07/2018     Priority: Medium       MEDICATIONS:   Prescription Medications as of 9/21/2020       Rx Number Disp Refills Start End Last Dispensed Date Next Fill Date Owning Pharmacy    IBUPROFEN PO            Sig: Take 800 mg by mouth every 6 hours as needed for moderate pain    Class: Historical    Route: Oral    levothyroxine (SYNTHROID/LEVOTHROID) 137 MCG tablet    11/15/2017 11/15/2018       Sig: Take 137 mcg by mouth daily    Class: Historical    Route: Oral    ondansetron (ZOFRAN ODT) 4 MG ODT tab  10 tablet 0 6/5/2018        Sig: Take 1-2 tablets (4-8 mg) by mouth every 8 hours as needed for nausea or vomiting    Class: Local Print    Route: Oral    oxyCODONE IR (ROXICODONE) 5 MG tablet  6 tablet 0 6/5/2018        Sig: Take 1 tablet (5 mg) by mouth every 6 hours as needed for severe pain    Class: Local Print    Earliest  Fill Date: 6/5/2018    Route: Oral          ALLERGIES:   Patient has no known allergies.    ROS:  An 11 point review of system was performed and pertinent negative and positives are mentioned in HPI.        Physical Examination:   VITALS:   There were no vitals taken for this visit.  No physical exam.    Labs:    BMP RESULTS:  Lab Results   Component Value Date     06/05/2018    POTASSIUM 3.6 06/05/2018    CHLORIDE 107 06/05/2018    CO2 25 06/05/2018    ANIONGAP 7 06/05/2018    GLC 92 06/05/2018    BUN 10 06/05/2018    CR 0.58 06/05/2018    GFRESTIMATED >90 06/05/2018    GFRESTBLACK >90 06/05/2018    ANDREA 8.1 (L) 06/05/2018        CBC RESULTS:  Lab Results   Component Value Date    WBC 5.8 06/04/2018    RBC 4.33 06/04/2018    HGB 13.0 06/04/2018    HCT 39.1 06/04/2018    MCV 90 06/04/2018    MCH 30.0 06/04/2018    MCHC 33.2 06/04/2018    RDW 12.6 06/04/2018     06/04/2018       INR/PTT:  Lab Results   Component Value Date    INR 1.06 06/04/2018    PTT 35 05/24/2018       Diagnostic studies: CTA 9/17/2020:   Patent splenic artery stent graft, with stable involution  of prior splenic artery aneurysm. No evidence of endoleak on this  single phase arterial examination. No evidence of splenic infarct.    Assessment Healthy 30 yo pt with successful treatment of asymptomatic splenic artery aneurysm, patent stent without endoleak or flow to the aneurysm sac and continued perfusion of the spleen.  Plan   - RTC in 1 year  - New CTA abdomen, single phase arterial  - Will decide about further follow up intervals at the next visit    I, Dr Beto Easley, performed the entirety of this telemedicine visit.    Beto Easley MD    Vascular and Interventional Radiolgy  [facility]              CC  Patient Care Team:  [facility], [place], MD as PCP - General (Family Practice)  Pebbles Choudhury MD (Internal Medicine)  Beto Easley MD as MD (Radiology)  SELF, REFERRED              Again,  thank you for allowing me to participate in the care of your patient.      Sincerely,    Beto Easley MD

## 2020-09-21 NOTE — PATIENT INSTRUCTIONS
Octavia you have had your virtual follow up today with Dr Easley IR/Vascular regarding your splenic artery aneurysm post embolization.  Your CT completed on 9/17/20 has been reviewed with you during this visit.     Plan:    Next follow up will be in one year with single phase arterial CT and visit with Dr Easley.  A clinic coordinator will contact you to make your appointments in approximately 9 months.      Please don't hesitate to reach out with questions or concerns,    LINUS Aparicio, RN, BSN  Interventional Radiology Nurse Coordinator   Phone:  [phone]

## 2021-07-03 ENCOUNTER — TELEPHONE (OUTPATIENT)
Dept: INTERVENTIONAL RADIOLOGY/VASCULAR | Facility: CLINIC | Age: 31
End: 2021-07-03

## 2021-07-04 NOTE — TELEPHONE ENCOUNTER
Unable to contact pt to schedule annual CT abd and  Dr Easley visit in September.    [facility] for pt to call and scheduled Lazaro Titus on 6/30/2021 at 1:28 PM  I called pts phone number 2x's and each time the phone would ring then hang up. Letter sent Lazaro Titus on 7/3/2021 at 12:17 PM  Lazaro Titus on 7/3/2021 at 12:14 PM

## 2021-11-08 DIAGNOSIS — I72.8 SPLENIC ARTERY ANEURYSM (H): Primary | ICD-10-CM

## 2024-03-27 ENCOUNTER — TELEPHONE (OUTPATIENT)
Dept: INTERVENTIONAL RADIOLOGY/VASCULAR | Facility: CLINIC | Age: 34
End: 2024-03-27

## 2024-03-27 DIAGNOSIS — I72.8 SPLENIC ARTERY ANEURYSM (H): Primary | ICD-10-CM

## 2024-03-27 NOTE — TELEPHONE ENCOUNTER
[facility] Call Center    Phone Message    May a detailed message be left on voicemail: yes     Reason for Call: Other: Pt states she needs an annual Follow-up. Please call back to discuss/schedule. Thank you.      Action Taken: Message routed to:  [facility] IR    Travel Screening: Not Applicable

## 2024-03-28 ENCOUNTER — TELEPHONE (OUTPATIENT)
Dept: RADIOLOGY | Facility: CLINIC | Age: 34
End: 2024-03-28

## 2024-03-28 NOTE — TELEPHONE ENCOUNTER
I called and spoke with Octavia.  She is overdue for her annual splenic artery aneurysm follow up.  She has been doing well, still gets 1-2 times per month left sided pain.  Unclear if its gas or what.  She will be contacted to schedule CTA abdomen and follow up visit with LOW RUDD in clinic or virtual.  Thanks,     A. Nikki Aparicio, RN, BSN  Interventional Radiology Care Coordinator   Phone:  [phone]

## 2024-04-08 ENCOUNTER — ANCILLARY PROCEDURE (OUTPATIENT)
Dept: CT IMAGING | Facility: CLINIC | Age: 34
End: 2024-04-08
Attending: RADIOLOGY

## 2024-04-08 DIAGNOSIS — I72.8 SPLENIC ARTERY ANEURYSM (H): ICD-10-CM

## 2024-04-08 PROCEDURE — 74175 CTA ABDOMEN W/CONTRAST: CPT | Performed by: RADIOLOGY

## 2024-04-08 RX ORDER — IOPAMIDOL 755 MG/ML
90 INJECTION, SOLUTION INTRAVASCULAR ONCE
Status: COMPLETED | OUTPATIENT
Start: 2024-04-08 | End: 2024-04-08

## 2024-04-08 RX ADMIN — IOPAMIDOL 90 ML: 755 INJECTION, SOLUTION INTRAVASCULAR at 16:34

## 2024-04-08 NOTE — DISCHARGE INSTRUCTIONS

## 2024-04-09 NOTE — PROGRESS NOTES
INTERVENTIONAL RADIOLOGY Video CLINIC NOTE  04/10/24    Patient Name:  Octavia Treviño  Medical Record Number:  [id number]  YOB: 1990  Reason for Visit:  Embolization of 2.3 cm distal splenic artery aneurysm with combination of MVP 5 embolization of the upper pole branch and a stent graft deployment across the aneurysmal sac into the middle/lower pole branch on 6/4/2018.      Impression:    #splenic artery aneurysm embolization 6/4/2018.  Doing well.   CTA reviewed with Dr. Easley and with patient.  Patent stent.  No aneurysm sac and no new aneurysm.  Patient considering pregnancy, will call IR if new timing of follow-up is needed.  Do not recommend full body scan in absence of symptoms to assess for other aneurysms.      #hepatic steatosis: Follow up with PCP.      Plan:  CTA ordered to be done in 2 years and follow-up in [facility] after.    At that visit we can consider discharging back to care of PCP for follow-up.    History of Present Illness:    Octavia Treviño is a 33 year old female who underwent treatment of asymptomatic splenic artery aneursym with embolization of 2.3 cm distal splenic artery aneurysm with combination of MVP 5 embolization of the upper pole branch and a stent graft deployment across the aneurysmal sac into the middle/lower pole branch on 6/4/2018.     Last seen 9/21/2020 by Dr. Tracee MORRISSEY.      Denies any new abdominal symptoms, nausea, vomiting or any other related concerns.      Imaging Reviewed:  4/8/2024:  CTA:  IMPRESSION:  1. Stable splenic artery aneurysm treatment changes.       A. Patent stent-graft, unchanged in position.        B. Embolization plug unchanged in position.       C. No aneurysm sac. No new aneurysm.       D. Slight kink at the edges of the stent-graft, unchanged. Splenic artery otherwise patent.     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     Priority: Medium    Hypothyroidism      Priority: Medium    Transplant donor evaluation 05/07/2018     Priority: Medium     Medications:  Prescription Medications as of 4/10/2024         Rx Number Disp Refills Start End Last Dispensed Date Next Fill Date Owning Pharmacy    IBUPROFEN PO  -- --  --       Sig: Take 800 mg by mouth every 6 hours as needed for moderate pain    Class: Historical    Route: Oral    levothyroxine (SYNTHROID/LEVOTHROID) 137 MCG tablet  -- -- 11/15/2017 11/15/2018       Sig: Take 137 mcg by mouth daily    Class: Historical    Route: Oral    ondansetron (ZOFRAN ODT) 4 MG ODT tab  10 tablet 0 6/5/2018 --       Sig: Take 1-2 tablets (4-8 mg) by mouth every 8 hours as needed for nausea or vomiting    Class: Local Print    Route: Oral    oxyCODONE IR (ROXICODONE) 5 MG tablet  6 tablet 0 6/5/2018 --       Sig: Take 1 tablet (5 mg) by mouth every 6 hours as needed for severe pain    Class: Local Print    Earliest Fill Date: 6/5/2018    Route: Oral          Allergies:  No Known Allergies    Results Reviewed:  Lab Results   Component Value Date     06/04/2018     Lab Results   Component Value Date    INR 1.06 06/04/2018     Vital Signs:  Ht 1.651 m (5' 5\")   Wt 93 kg (205 lb)   BMI 34.11 kg/m    General:  alert and oriented times 3 in NAD  Psych:  affect appropriate.    Chest:  non labored breathing on room air  =====  Phone call Duration:  12 minutes and 9 secs  Chart Review which includes medical history, imaging, labs, medications, allergies, telephone visit and documentation:  45 minutes      CC:  1) Referring Provider  Beto Easley MD  [address] [place] [address] " [id number]    2) Primary Care Provider  [facility]  [address] [place] [address]

## 2024-04-10 ENCOUNTER — VIRTUAL VISIT (OUTPATIENT)
Dept: VASCULAR SURGERY | Facility: CLINIC | Age: 34
End: 2024-04-10

## 2024-04-10 VITALS — HEIGHT: 65 IN | WEIGHT: 205 LBS | BODY MASS INDEX: 34.16 KG/M2

## 2024-04-10 DIAGNOSIS — I72.8 SPLENIC ARTERY ANEURYSM (H): Primary | ICD-10-CM

## 2024-04-10 PROCEDURE — 99203 OFFICE O/P NEW LOW 30 MIN: CPT | Mod: 95 | Performed by: PHYSICIAN ASSISTANT

## 2024-04-10 ASSESSMENT — PAIN SCALES - GENERAL: PAINLEVEL: NO PAIN (0)

## 2024-04-10 NOTE — NURSING NOTE
No other vitals to report today      Is the patient currently in the state of [place]? YES    Visit mode:VIDEO    If the visit is dropped, the patient can be reconnected by: VIDEO VISIT: Text to cell phone:   Telephone Information:   Mobile [phone]       Will anyone else be joining the visit? NO  (If patient encounters technical issues they should call [phone]    How would you like to obtain your AVS? MyChart    Are changes needed to the allergy or medication list?  Pt states still taking Levothyroxine,  on medication list    Are refills needed on medications prescribed by this physician? NA    Reason for visit: DEYANIRA Stewart MA VVF

## 2024-04-10 NOTE — PROGRESS NOTES
Virtual Visit Details    Type of service:  Video Visit     Originating Location (pt. Location): Home    Distant Location (provider location):  On-site  Platform used for Video Visit: [facility]      Type of Visit: Virtual: [facility]    Patient is here for a return visit to discuss  Splenic artery aneurysm .     Vitals - Patient Reported  Pain Score: No Pain (0)    Questions patient would like addressed today are: NA    Refills are needed: No    How would you like to obtain your AVS? Waqas Stewart MA

## 2024-04-28 ENCOUNTER — HEALTH MAINTENANCE LETTER (OUTPATIENT)
Age: 34
End: 2024-04-28

## 2025-05-11 ENCOUNTER — HEALTH MAINTENANCE LETTER (OUTPATIENT)
Age: 35
End: 2025-05-11

## (undated) RX ORDER — LIDOCAINE HYDROCHLORIDE 10 MG/ML
INJECTION, SOLUTION EPIDURAL; INFILTRATION; INTRACAUDAL; PERINEURAL
Status: DISPENSED
Start: 2018-06-04

## (undated) RX ORDER — FENTANYL CITRATE 50 UG/ML
INJECTION, SOLUTION INTRAMUSCULAR; INTRAVENOUS
Status: DISPENSED
Start: 2018-06-04

## (undated) RX ORDER — SODIUM CHLORIDE 9 MG/ML
INJECTION, SOLUTION INTRAVENOUS
Status: DISPENSED
Start: 2018-06-04

## (undated) RX ORDER — CEFAZOLIN SODIUM 2 G/100ML
INJECTION, SOLUTION INTRAVENOUS
Status: DISPENSED
Start: 2018-06-04

## (undated) RX ORDER — NITROGLYCERIN 5 MG/ML
VIAL (ML) INTRAVENOUS
Status: DISPENSED
Start: 2018-06-04